# Patient Record
Sex: MALE | Race: WHITE | NOT HISPANIC OR LATINO | Employment: FULL TIME | URBAN - METROPOLITAN AREA
[De-identification: names, ages, dates, MRNs, and addresses within clinical notes are randomized per-mention and may not be internally consistent; named-entity substitution may affect disease eponyms.]

---

## 2023-09-25 DIAGNOSIS — R06.2 WHEEZING: Primary | ICD-10-CM

## 2023-09-26 RX ORDER — ALBUTEROL SULFATE 90 UG/1
AEROSOL, METERED RESPIRATORY (INHALATION)
Qty: 8 G | Refills: 3 | Status: SHIPPED | OUTPATIENT
Start: 2023-09-26

## 2023-12-18 ENCOUNTER — OFFICE VISIT (OUTPATIENT)
Dept: PRIMARY CARE | Facility: CLINIC | Age: 38
End: 2023-12-18
Payer: COMMERCIAL

## 2023-12-18 VITALS
SYSTOLIC BLOOD PRESSURE: 134 MMHG | OXYGEN SATURATION: 97 % | HEIGHT: 70 IN | WEIGHT: 209 LBS | BODY MASS INDEX: 29.92 KG/M2 | HEART RATE: 63 BPM | DIASTOLIC BLOOD PRESSURE: 72 MMHG | TEMPERATURE: 97.1 F

## 2023-12-18 DIAGNOSIS — Z00.00 HEALTH MAINTENANCE EXAMINATION: ICD-10-CM

## 2023-12-18 DIAGNOSIS — B00.1 HERPES LABIALIS: ICD-10-CM

## 2023-12-18 DIAGNOSIS — R06.2 WHEEZING: ICD-10-CM

## 2023-12-18 DIAGNOSIS — R09.82 POST-NASAL DRIP: Primary | ICD-10-CM

## 2023-12-18 DIAGNOSIS — K20.0 EOSINOPHILIC ESOPHAGITIS: ICD-10-CM

## 2023-12-18 LAB
ALBUMIN SERPL BCP-MCNC: 4.8 G/DL (ref 3.4–5)
ALP SERPL-CCNC: 54 U/L (ref 33–120)
ALT SERPL W P-5'-P-CCNC: 23 U/L (ref 10–52)
ANION GAP SERPL CALC-SCNC: 11 MMOL/L (ref 10–20)
AST SERPL W P-5'-P-CCNC: 21 U/L (ref 9–39)
BASOPHILS # BLD AUTO: 0.05 X10*3/UL (ref 0–0.1)
BASOPHILS NFR BLD AUTO: 0.8 %
BILIRUB SERPL-MCNC: 0.8 MG/DL (ref 0–1.2)
BUN SERPL-MCNC: 12 MG/DL (ref 6–23)
CALCIUM SERPL-MCNC: 9.9 MG/DL (ref 8.6–10.6)
CHLORIDE SERPL-SCNC: 103 MMOL/L (ref 98–107)
CHOLEST SERPL-MCNC: 239 MG/DL (ref 0–199)
CHOLESTEROL/HDL RATIO: 4.3
CO2 SERPL-SCNC: 27 MMOL/L (ref 21–32)
CREAT SERPL-MCNC: 1.09 MG/DL (ref 0.5–1.3)
EOSINOPHIL # BLD AUTO: 0.27 X10*3/UL (ref 0–0.7)
EOSINOPHIL NFR BLD AUTO: 4.1 %
ERYTHROCYTE [DISTWIDTH] IN BLOOD BY AUTOMATED COUNT: 11.4 % (ref 11.5–14.5)
GFR SERPL CREATININE-BSD FRML MDRD: 90 ML/MIN/1.73M*2
GLUCOSE SERPL-MCNC: 85 MG/DL (ref 74–99)
HCT VFR BLD AUTO: 40.9 % (ref 41–52)
HDLC SERPL-MCNC: 55.9 MG/DL
HGB BLD-MCNC: 14.2 G/DL (ref 13.5–17.5)
IMM GRANULOCYTES # BLD AUTO: 0.02 X10*3/UL (ref 0–0.7)
IMM GRANULOCYTES NFR BLD AUTO: 0.3 % (ref 0–0.9)
LDLC SERPL CALC-MCNC: 163 MG/DL
LYMPHOCYTES # BLD AUTO: 2.12 X10*3/UL (ref 1.2–4.8)
LYMPHOCYTES NFR BLD AUTO: 32.3 %
MCH RBC QN AUTO: 31.6 PG (ref 26–34)
MCHC RBC AUTO-ENTMCNC: 34.7 G/DL (ref 32–36)
MCV RBC AUTO: 91 FL (ref 80–100)
MONOCYTES # BLD AUTO: 0.28 X10*3/UL (ref 0.1–1)
MONOCYTES NFR BLD AUTO: 4.3 %
NEUTROPHILS # BLD AUTO: 3.82 X10*3/UL (ref 1.2–7.7)
NEUTROPHILS NFR BLD AUTO: 58.2 %
NON HDL CHOLESTEROL: 183 MG/DL (ref 0–149)
NRBC BLD-RTO: 0 /100 WBCS (ref 0–0)
PLATELET # BLD AUTO: 209 X10*3/UL (ref 150–450)
POTASSIUM SERPL-SCNC: 4 MMOL/L (ref 3.5–5.3)
PROT SERPL-MCNC: 7 G/DL (ref 6.4–8.2)
RBC # BLD AUTO: 4.5 X10*6/UL (ref 4.5–5.9)
SODIUM SERPL-SCNC: 137 MMOL/L (ref 136–145)
TRIGL SERPL-MCNC: 101 MG/DL (ref 0–149)
VLDL: 20 MG/DL (ref 0–40)
WBC # BLD AUTO: 6.6 X10*3/UL (ref 4.4–11.3)

## 2023-12-18 PROCEDURE — 90471 IMMUNIZATION ADMIN: CPT | Performed by: INTERNAL MEDICINE

## 2023-12-18 PROCEDURE — 99213 OFFICE O/P EST LOW 20 MIN: CPT | Performed by: INTERNAL MEDICINE

## 2023-12-18 PROCEDURE — 85025 COMPLETE CBC W/AUTO DIFF WBC: CPT

## 2023-12-18 PROCEDURE — 36415 COLL VENOUS BLD VENIPUNCTURE: CPT

## 2023-12-18 PROCEDURE — 90715 TDAP VACCINE 7 YRS/> IM: CPT | Performed by: INTERNAL MEDICINE

## 2023-12-18 PROCEDURE — 80061 LIPID PANEL: CPT

## 2023-12-18 PROCEDURE — 99395 PREV VISIT EST AGE 18-39: CPT | Performed by: INTERNAL MEDICINE

## 2023-12-18 PROCEDURE — 80053 COMPREHEN METABOLIC PANEL: CPT

## 2023-12-18 RX ORDER — VALACYCLOVIR HYDROCHLORIDE 500 MG/1
TABLET, FILM COATED ORAL
Qty: 6 TABLET | Refills: 2 | Status: SHIPPED | OUTPATIENT
Start: 2023-12-18 | End: 2023-12-18 | Stop reason: ENTERED-IN-ERROR

## 2023-12-18 RX ORDER — ALBUTEROL SULFATE 90 UG/1
2 AEROSOL, METERED RESPIRATORY (INHALATION) EVERY 4 HOURS PRN
Qty: 8.5 G | Refills: 2 | Status: SHIPPED | OUTPATIENT
Start: 2023-12-18 | End: 2024-12-17

## 2023-12-18 RX ORDER — FLUTICASONE PROPIONATE 50 MCG
2 SPRAY, SUSPENSION (ML) NASAL DAILY
Qty: 16 G | Refills: 11 | Status: SHIPPED | OUTPATIENT
Start: 2023-12-18 | End: 2023-12-18 | Stop reason: SDUPTHER

## 2023-12-18 RX ORDER — OMEPRAZOLE 20 MG/1
20 CAPSULE, DELAYED RELEASE ORAL DAILY
Qty: 90 CAPSULE | Refills: 3 | Status: SHIPPED | OUTPATIENT
Start: 2023-12-18 | End: 2024-12-17

## 2023-12-18 RX ORDER — VALACYCLOVIR HYDROCHLORIDE 1 G/1
TABLET, FILM COATED ORAL
Qty: 4 TABLET | Refills: 5 | Status: SHIPPED | OUTPATIENT
Start: 2023-12-18 | End: 2023-12-25

## 2023-12-18 RX ORDER — FLUTICASONE PROPIONATE 50 MCG
2 SPRAY, SUSPENSION (ML) NASAL DAILY
Qty: 16 G | Refills: 2 | Status: SHIPPED | OUTPATIENT
Start: 2023-12-18 | End: 2024-12-17

## 2023-12-18 ASSESSMENT — ENCOUNTER SYMPTOMS
DIZZINESS: 0
FEVER: 0
ACTIVITY CHANGE: 0
SORE THROAT: 0
DIFFICULTY URINATING: 0
JOINT SWELLING: 0
APPETITE CHANGE: 0
DYSURIA: 0
COUGH: 0
FATIGUE: 0
ABDOMINAL PAIN: 0
WHEEZING: 1
SEIZURES: 0
PALPITATIONS: 1
RHINORRHEA: 0

## 2023-12-18 NOTE — PATIENT INSTRUCTIONS
- flonase daily   - albuterol prior to exercise  - if no improvement please come back to us and we can talk about pulmonary function tests

## 2023-12-18 NOTE — PROGRESS NOTES
Subjective   Patient ID: Eugene West is a 38 y.o. male who presents for Annual Exam.    37 yo M hx allergic rhinitis vs postnasal drip vs exercise induced asthma, GERD and EoE (resolved), cold sores presenting for annual physical.    Since about a year ago Feels he is wheezing with exercise, coughing  On and off most of the year  Has been using the inhaler more the past 3 weeks in the morning and before he goes to bed and feels that it helps with the wheezing  Never had asthma as a child  It has limited his ability to exercise the way that he would like - tried some more aerobic exercise this summer and started to wheeze, inhaler helped immediately  Not coughing at night, feels like he has some flegm  Feels tightness in his throat  Has had some congestion in the last few weeks but not sure if its a cold  No itchy eyes, no sneezing    Having some palpitations intermittently, less than weekly once or twice a month. No dizziness. Lasting seconds. Not associated with exercise.     Not having worsening stress or anxiety symptoms aside from the usual with 4 kids.    Has GERD symptoms intermittently and takes omeprazole when he knows the reflux will flare but feels it is fairly well controlled.   Sep 2020 and dec 2020 scopes doesn't know if he is supposed to have followup    Etoh: at home 5-6 drinks per week but when traveling he drinks about 15 drinks per week, he says he knows he needs to cut down on that  Nonsmoker, occasional marijuana  Works as a  and travels sometimes twice a month  Has 4 boys, youngest is 7 months    Exercise: 5 times a week mostly weight training  Diet: has been fairly unhealthy lately with the travel      ----------------Copied forward for reference:   PMH:  -GERD with esophagitis  -Recurrent herpes labialis  - PND/allergies, ? reactive airway disease/exercise-induced asthma    PSH:  -EGD x2  -[place] teeth removal  -Deviated septum repair     Moved with his family from [place]  "in January 2021.  Lives with his wife and 3 sons (ages 5, almost 4 and 2) in [place]. Expecting #4 in May 2023.  He works for  for [employer].  Has decreased his EtOH intake, can have 2-4 drinks a few times a week. No tobacco but occasional marijuana use.  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  Continues to workout regularly, combination of cardio and weights. Diet is on and off, moderation has been a challenge. Over the past 2-3 years he has fluctuated between around 180 and 200 pounds.            Review of Systems   Constitutional:  Negative for activity change, appetite change, fatigue and fever.   HENT:  Positive for congestion. Negative for postnasal drip, rhinorrhea and sore throat.    Eyes:  Negative for visual disturbance.   Respiratory:  Positive for wheezing. Negative for cough.    Cardiovascular:  Positive for palpitations. Negative for chest pain and leg swelling.   Gastrointestinal:  Negative for abdominal pain.   Genitourinary:  Negative for difficulty urinating and dysuria.   Musculoskeletal:  Negative for joint swelling.   Skin:  Negative for rash.   Neurological:  Negative for dizziness and seizures.       Objective   /72   Pulse 63   Temp 36.2 °C (97.1 °F)   Ht 1.778 m (5' 10\")   Wt 94.8 kg (209 lb)   SpO2 97%   BMI 29.99 kg/m²     Physical Exam  Constitutional:       General: He is not in acute distress.  HENT:      Right Ear: Tympanic membrane normal.      Left Ear: Tympanic membrane normal.      Mouth/Throat:      Mouth: Mucous membranes are moist.      Comments: Cobblestoning in the pharynx  Eyes:      Extraocular Movements: Extraocular movements intact.   Cardiovascular:      Rate and Rhythm: Normal rate and regular rhythm.      Pulses: Normal pulses.   Pulmonary:      Effort: Pulmonary effort is normal. No respiratory distress.      Breath sounds: No wheezing.   Abdominal:      General: There is no distension. "   Musculoskeletal:         General: No swelling.   Lymphadenopathy:      Cervical: No cervical adenopathy.   Skin:     Findings: No bruising or rash.   Neurological:      General: No focal deficit present.      Mental Status: Mental status is at baseline.         Assessment/Plan     37 yo M hx allergic rhinitis vs postnasal drip vs exercise induced asthma, GERD and EoE (resolved), cold sores presenting for annual physical. He describes some symptoms consistent with postnasal drip including feeling that he is wheezing and has phlegm in his throat in the morning and so will trial flonase nasal spray but that he has some exercise induced symptoms and improvement with albuterol use make a reactive airway component also likely. Will trial flonase and albuterol inhaler prior to exercise and assess for improvement - can do PFTs if not improving after flonase.    Plan:   #wheeze - postnasal drip, possible exercise induced asthma  - continue albuterol as needed: can do 15-30 minutes prior to exercise  - trial flonase nightly   - if no improvement or worsening can do PFTs, may need methacholine challenge if negative    #palpitations - intermittent, not exercise induced, no syncope  - advised to call or schedule if they become more frequent, more prolonged, and if associated with loss of consciousness or dizziness and would do some event monitoring but at this time will hold    #EoE and GERD  - continue omeprazole daily as needed   - can consider GI referral if GERD symptoms, or symptoms of food getting stuck return    #cold sores  - refilled valtrex 2g BID for 1 day at first sign of outbreak    #health maintenance:   - immunizations: thinks maybe tdap was in 2011 but might have been [id number] he is ok with repeating that today, advise flu vaccine and covid   - lifestyle: excellent exercise, continue to work on healthy diet and alcohol intake (too much when travelling for work), nonsmoker  - declines STI screening  - lipids  CBC and CMP today   Problem List Items Addressed This Visit             ICD-10-CM    Eosinophilic esophagitis K20.0    Relevant Medications    omeprazole (PriLOSEC) 20 mg DR capsule    Post-nasal drip - Primary R09.82    Relevant Medications    fluticasone (Flonase) 50 mcg/actuation nasal spray    Herpes labialis B00.1    Relevant Medications    valACYclovir (Valtrex) 1 gram tablet     Other Visit Diagnoses         Codes    Wheezing     R06.2    Relevant Medications    albuterol (ProAir HFA) 90 mcg/actuation inhaler    Health maintenance examination     Z00.00    Relevant Orders    CBC and Auto Differential    Comprehensive metabolic panel    Lipid panel

## 2023-12-19 NOTE — RESULT ENCOUNTER NOTE
Kidney function, liver function, blood counts all within normal limits; LDL is 163 up from 136 last year aligns with some weight gain and less healthy diet as discussed during his visit yesterday - called Mr. West to let him know about this result and to focus on lifestyle changes to help improve the cholesterol.

## 2023-12-19 NOTE — PROGRESS NOTES
I saw and evaluated the patient. I personally obtained the key and critical portions of the history and physical exam or was physically present for key and critical portions performed by the resident/fellow. I reviewed the resident/fellow's documentation and discussed the patient with the resident/fellow. I agree with the resident/fellow's medical decision making as documented in the note.    Tang Foote MD

## 2024-12-16 ENCOUNTER — EVALUATION (OUTPATIENT)
Dept: PHYSICAL THERAPY | Facility: HOSPITAL | Age: 39
End: 2024-12-16
Payer: COMMERCIAL

## 2024-12-16 DIAGNOSIS — G89.29 CHRONIC RIGHT SHOULDER PAIN: Primary | ICD-10-CM

## 2024-12-16 DIAGNOSIS — M54.9 UPPER BACK PAIN: ICD-10-CM

## 2024-12-16 DIAGNOSIS — M25.511 CHRONIC RIGHT SHOULDER PAIN: Primary | ICD-10-CM

## 2024-12-16 PROCEDURE — 97110 THERAPEUTIC EXERCISES: CPT | Mod: GP | Performed by: PHYSICAL THERAPIST

## 2024-12-16 PROCEDURE — 97161 PT EVAL LOW COMPLEX 20 MIN: CPT | Mod: GP | Performed by: PHYSICAL THERAPIST

## 2024-12-16 PROCEDURE — 97140 MANUAL THERAPY 1/> REGIONS: CPT | Mod: GP | Performed by: PHYSICAL THERAPIST

## 2024-12-16 NOTE — PROGRESS NOTES
Physical Therapy  Physical Therapy Orthopedic Evaluation    Patient Name: Eugene West  MRN: [id number]  Today's Date: 12/16/2024  Time Calculation  Start Time: 1415  Stop Time: 1505  Time Calculation (min): 50 min    Insurance:  Visit number: 1 of 20  Authorization info: No Auth  Insurance Type: MMO    General:  Reason for visit: (R) Shoulder and Cervicothoracic Pain  Referred by: Direct Access    Current Problem  1. Chronic right shoulder pain        2. Upper back pain            Precautions: None  Precautions  STEADI Fall Risk Score (The score of 4 or more indicates an increased risk of falling): 0    Medical History Form: Reviewed (scanned into chart)    Subjective:     Chief Complaint: Patient presents to clinic with complaints of (R) shoulder and cervicothoracic pain. Patient reports (R) shoulder pain began in 2008/2009 while playing baseball. Patient states he was diagnosed with a labral tear and elected for conservative management; performed rehab and was able to continue active lifestyle. Patient notes a few year ago he began experiencing occasional shoulder discomfort; symptoms most present after heavy activity or weight lifting. Patient notes that symptoms have continued to worsen and become more consistent over the past year. Pain is located to anterior shoulder and is greatest during and following overhead activities.    Patient also notes recent cervicothoracic discomfort which started ~2 months ago. Patient notes pain began after performing heavy shrugs (denies discomfort or issue during exercise). Patient states symptoms have slightly improved but are still present when bending forward or reaching across body.    Roberta experiencing neck pain about 2 months ago after performing shrugs in the gym. Pain is slightly improved but has remained fairly constant. Sharp 8/10. Left rotation tight and pain side bend left  Onset Date: 10/1/2024  MATHIEU: Chronic and Overuse    Current Condition:    "Same    Pain:  Pain Assessment: 0-10  0-10 (Numeric) Pain Score: 8  Location: (R) Shoulder 7/10  Description: Sharp, tight, aching  Aggravating Factors: Reaching overhead, Reaching behind back, and Dressing/Grooming  Relieving Factors:  Rest and Ice    Location: Cervicothoracic 8/10  Description: Sharp  Aggravating Factors: Dressing/Grooming, bending forward, reaching across body  Relieving Factors:  Rest    Relevant Information (PMH & Previous Tests/Imaging): X Ray, MRI of shoulder  Previous Interventions/Treatments: Physical Therapy    Prior Level of Function (PLOF)  Patient previously independent with all ADLs  Exercise/Physical Activity: Exercises regularly; cross training  Work/School: Full time; does not interfere with work    Patients Living Environment: Reviewed and no concern    Primary Language: English    There are no spiritual/cultural practices/values/needs that are important to know    Patient's Goal(s) for Therapy: \"I want to regain shoulder ROM and decrease my shoulder and upper back pain so I can remain active.\"    Red Flags: Do you have any of the following? No  Fever/chills, unexplained weight changes, dizziness/fainting, unexplained change in bowel or bladder functions, unexplained malaise or muscle weakness, night pain/sweats, numbness or tingling    Objective:  Objective       ROM    Cervical AROM (Degrees)    Flexion: WNL  Extension: WNL  (L) Side Bend: 75%  (R) Side Bend: WNL  (L) Rotation: 75%  (R) Rotation: WNL      Shoulder AROM (Degrees)      (R)  (L)  Flexion: 175  175   Abduction: 170  170     Functional ER: T2  T3     Functional IR: L4  T12         Shoulder PROM (Degrees)      (R)  (L)  Flexion: 175  175      Abduction: 170  170     ER at 0:  85  85     IR at 0:  Belly  Belly     ER at 45: 85  85      IR at 45: 45  75      ER at 90: 80  80      IR at 90: 40  75               Strength " Testing    Shoulder    (R)  (L)  Flexion: 5/5  5/5      Abduction: 5/5  5/5     ER:  4+/5  5/5     IR:  4+/5  5/5         Elbow    (R)  (L)  Flexion: 5/5  5/5       Extension: 5/5  5/5         Periscapular Musculature    (R)  (L)  Upper Traps: 5/5  5/5     Middle Traps: 4+/5  4+/5     Lower Traps: 4/5  4/5     Rhomboids: 4+/5  4+/5           Posture: Slightly rounded/anterior shoulders      Palpation: (+) TTP and increased tension noted throughout (R) RTC and deltoid musculature. (+) TTP and increased tension noted throughout cervicothoracic paraspinals.      Joint Mobility: Decreased PA glide noted to C7-T4. Tight posterior capsule (R) shoulder          Special Tests      Neer Impingement: (+) Pain  Joe Art: (+) Pain  Empty Can: (-)  Speeds Test: (-)  O'Briens Test: (+) R       Radicular Symptoms: Patient denies radicular symptoms        Outcome Measures:  Other Measures  Disability of Arm Shoulder Hand (DASH): 9.09     EDUCATION: home exercise program, plan of care, activity modifications, pain management, and injury pathology       Goals: Set and discussed today  Active       PT Problem       Short Term       Start:  12/18/24    Expected End:  01/13/25       Decrease patients complaints of (R) shoulder pain to 3/10 on average with ADLs by week 4  Decrease patient complaints of cervicothoracic pain to 3/10 on avaerage with ADLs by week 4  Improved patients IR ROM to 60 degrees by 4 weeks to allow patient to reach behind back for dressing  Improve patients RTC and periscapular strength to 4/5 to decrease compensations with ADLs  Patient will be independent with HEP by week 1         Long Term       Start:  12/18/24    Expected End:  02/10/25       Decrease patients complaints of (R) shoulder and cervicothoracic pain to 0/10 with all ADLs and therapeutic exercises by week 8  Improve patients IR ROM to 80+ degrees by 8 weeks to allow patient to perform dressing, grooming, and throw a ball without  compensation  Improve patients RTC and periscapular strength to 5/5 to decrease compensations with ADLs               Plan of care was developed with input and agreement by the patient      Treatment Performed:  Therapeutic Exercise  Therapeutic Exercise Performed: Yes  Therapeutic Exercise Activity 1: Cross body streth 2 x 30 sec  Therapeutic Exercise Activity 2: (B) ER w/ scap retraction 2 x 10 reps  Therapeutic Exercise Activity 3: ER 2 x 10 reps  Therapeutic Exercise Activity 4: IR 2 x 10 reps  Therapeutic Exercise Activity 5: Self mobilization with lacrosse ball    Manual Therapy  Manual Therapy Performed: Yes (STM to (R) deltoid, RTC, UT and thoracic paraspinals. Joint mobilizations to (R) shoulder and T spine. PROM/Manaul stretching to (R) shoulder)    \    PT Evaluation Time Entry  PT Evaluation (Low) Time Entry: 25  PT Therapeutic Procedures Time Entry  Manual Therapy Time Entry: 15  Therapeutic Exercise Time Entry: 10                      Assessment: Patient presents with signs and symptoms consistent with (R) shoulder pain secondary to previous labral tear and GIRD and cervicothoracic discomfort. These impairments are resulting in limited participation in pain-free ADLs and inability to perform at their prior level of function. Pt would benefit from physical therapy to address the impairments found & listed previously in the objective section in order to return to safe and pain-free ADLs and prior level of function.    *Advised patient to follow up with Dr. Valadez or Dr. Mays regarding shoulder pathology.       Clinical Presentation: Stable and/or uncomplicated characteristics    Plan:     Planned Interventions include: therapeutic exercise, self-care home management, manual therapy, therapeutic activities, gait training, neuromuscular coordination, vasopneumatic, dry needling, aquatic therapy  Frequency: 1-2 x Week  Duration: 8 Weeks  Rehab Potential/Prognosis: Giovany Cardenas, PT

## 2024-12-18 PROBLEM — G89.29 CHRONIC RIGHT SHOULDER PAIN: Status: ACTIVE | Noted: 2024-12-18

## 2024-12-18 PROBLEM — M25.511 CHRONIC RIGHT SHOULDER PAIN: Status: ACTIVE | Noted: 2024-12-18

## 2024-12-18 PROBLEM — M54.9 UPPER BACK PAIN: Status: ACTIVE | Noted: 2024-12-18

## 2024-12-18 ASSESSMENT — ENCOUNTER SYMPTOMS
OCCASIONAL FEELINGS OF UNSTEADINESS: 0
DEPRESSION: 0
LOSS OF SENSATION IN FEET: 0

## 2024-12-19 ASSESSMENT — PAIN - FUNCTIONAL ASSESSMENT: PAIN_FUNCTIONAL_ASSESSMENT: 0-10

## 2024-12-19 ASSESSMENT — PAIN SCALES - GENERAL: PAINLEVEL_OUTOF10: 8

## 2024-12-20 ENCOUNTER — APPOINTMENT (OUTPATIENT)
Dept: PRIMARY CARE | Facility: CLINIC | Age: 39
End: 2024-12-20
Payer: COMMERCIAL

## 2024-12-23 ENCOUNTER — APPOINTMENT (OUTPATIENT)
Dept: PHYSICAL THERAPY | Facility: HOSPITAL | Age: 39
End: 2024-12-23
Payer: COMMERCIAL

## 2024-12-23 DIAGNOSIS — M54.9 UPPER BACK PAIN: ICD-10-CM

## 2024-12-23 DIAGNOSIS — G89.29 CHRONIC RIGHT SHOULDER PAIN: Primary | ICD-10-CM

## 2024-12-23 DIAGNOSIS — M25.511 CHRONIC RIGHT SHOULDER PAIN: Primary | ICD-10-CM

## 2024-12-23 PROCEDURE — 97110 THERAPEUTIC EXERCISES: CPT | Mod: GP | Performed by: PHYSICAL THERAPIST

## 2024-12-23 PROCEDURE — 97140 MANUAL THERAPY 1/> REGIONS: CPT | Mod: GP | Performed by: PHYSICAL THERAPIST

## 2024-12-31 ASSESSMENT — PAIN - FUNCTIONAL ASSESSMENT: PAIN_FUNCTIONAL_ASSESSMENT: 0-10

## 2024-12-31 ASSESSMENT — PAIN SCALES - GENERAL: PAINLEVEL_OUTOF10: 6

## 2024-12-31 NOTE — PROGRESS NOTES
Physical Therapy  Physical Therapy Treatment Note    Patient Name: Eugene West  MRN: [id number]  Today's Date: 12/23/2024  Time Calculation  Start Time: 1330  Stop Time: 1430  Time Calculation (min): 60 min    Insurance:  Visit number: 2 of of 20  Authorization info: No Auth  Insurance Type: MMO     General:  Reason for visit: (R) Shoulder and Cervicothoracic Pain  Referred by: Direct Access    Current Problem  1. Chronic right shoulder pain        2. Upper back pain            Precautions: None      Subjective:     Patient reports general improvement in upper back and right shoulder pain. States he has been perfomring HEP    Pain  Pain Assessment: 0-10  0-10 (Numeric) Pain Score: 6    Performing HEP?: Yes      Objective:   (R) Shoulder ROM:  ER: 85  IR: 50 following manual      Treatment Performed:  Therapeutic Exercise  Therapeutic Exercise Performed: Yes  Therapeutic Exercise Activity 1: Cross body streth 2 x 30 sec  Therapeutic Exercise Activity 2: (B) ER w/ scap retraction 2 x 10 reps  Therapeutic Exercise Activity 3: ER 2 x 10 reps  Therapeutic Exercise Activity 4: IR 2 x 10 reps  Therapeutic Exercise Activity 5: D2 Flexion 2 x 10 reps  Therapeutic Exercise Activity 6: D2 extension 2 x 10 reps  Therapeutic Exercise Activity 7: Prone T and Y 2 x 10 reps  Therapeutic Exercise Activity 8: Sidelying ER 2 x 10 reps w/ 3lbs  Therapeutic Exercise Activity 9: Rows 2 x 10 reps  Therapeutic Exercise Activity 10: Shoulder extensions 2 x 10 reps    Manual Therapy  Manual Therapy Performed: Yes  Manual Therapy Activity 1: STM to (R) deltoid, RTC and periscapular musculature  Manual Therapy Activity 2: STM to (B) cervical/lumbar paraspinals, traps, and levator scap  Manual Therapy Activity 3: GH Joint mobilizations  Manual Therapy Activity 4: P-A glides to cervical/thoracic spine         PT Therapeutic Procedures Time Entry  Manual Therapy Time Entry: 25  Therapeutic Exercise Time Entry: 35                    Assessment:    The focus of the session was Strengthening, ROM, Stretching, joint mobilizations, and soft tissue massage. The pt demonstrated Fair tolerance to the noted exercises today. The pt is demonstrated Fair progress in skilled rehab at this time. The pt is still limited in overall Strength, ROM, Flexibility, Motor control, and Pain at this time. The pt continues to be a good candidate for skilled PT, in order to further improve Strength, ROM, Flexibility, Motor control, and Pain.        Plan:  Patient to continue following post op protocol; Continue to progress Shoulder and Scapular strength; Manual therapy to restore pain free ROM; Patient education on HEP and activity modification.        Vamsi Cardenas, PT

## 2025-01-07 ENCOUNTER — TREATMENT (OUTPATIENT)
Dept: PHYSICAL THERAPY | Facility: HOSPITAL | Age: 40
End: 2025-01-07
Payer: COMMERCIAL

## 2025-01-07 DIAGNOSIS — G89.29 CHRONIC RIGHT SHOULDER PAIN: ICD-10-CM

## 2025-01-07 DIAGNOSIS — M25.511 CHRONIC RIGHT SHOULDER PAIN: ICD-10-CM

## 2025-01-07 DIAGNOSIS — M54.9 UPPER BACK PAIN: ICD-10-CM

## 2025-01-07 PROCEDURE — 97140 MANUAL THERAPY 1/> REGIONS: CPT | Mod: GP | Performed by: PHYSICAL THERAPIST

## 2025-01-07 PROCEDURE — 97110 THERAPEUTIC EXERCISES: CPT | Mod: GP | Performed by: PHYSICAL THERAPIST

## 2025-01-07 ASSESSMENT — PAIN - FUNCTIONAL ASSESSMENT: PAIN_FUNCTIONAL_ASSESSMENT: 0-10

## 2025-01-07 ASSESSMENT — PAIN SCALES - GENERAL: PAINLEVEL_OUTOF10: 7

## 2025-01-07 NOTE — PROGRESS NOTES
Physical Therapy  Physical Therapy Treatment Note    Patient Name: Eugene West  MRN: [id number]  Today's Date: 1/7/2025  Time Calculation  Start Time: 1300  Stop Time: 1400  Time Calculation (min): 60 min    Insurance:  Visit number: 3 of of 20 (2 visits used in 2024)  Authorization info: No Auth  Insurance Type: MMO     General:  Reason for visit: (R) Shoulder and Cervicothoracic Pain  Referred by: Direct Access    Current Problem  1. Chronic right shoulder pain  Follow Up In Physical Therapy      2. Upper back pain  Follow Up In Physical Therapy          Precautions: None      Subjective:     Patient notes increased overall soreness the past week. Patient states he has been more active with family, but has not been performing weight training. Patient notes shoulder motion seems improved, but soreness is still present.    Pain  Pain Assessment: 0-10  0-10 (Numeric) Pain Score: 7    Performing HEP?: Yes      Objective:   (R) Shoulder ROM:  ER: 85  IR: 50 following manual      Treatment Performed:  Therapeutic Exercise  Therapeutic Exercise Performed: Yes  Therapeutic Exercise Activity 1: Cross body streth 2 x 30 sec  Therapeutic Exercise Activity 2: Sidelying ER 3 x 10 reps  Therapeutic Exercise Activity 5: D2 Flexion 2 x 10 reps  Therapeutic Exercise Activity 6: D2 extension 2 x 10 reps  Therapeutic Exercise Activity 7: Prone T and Y 2 x 10 reps  Therapeutic Exercise Activity 9: Rows from high pulley 3 x 10 reps  Therapeutic Exercise Activity 10: Row from low pulley 3 x 10 reps    Manual Therapy  Manual Therapy Performed: Yes  Manual Therapy Activity 1: STM to (R) deltoid, RTC and periscapular musculature  Manual Therapy Activity 2: STM to (B) cervical/lumbar paraspinals, traps, and levator scap  Manual Therapy Activity 3: GH Joint mobilizations  Manual Therapy Activity 4: P-A glides to cervical/thoracic spine         PT Therapeutic Procedures Time Entry  Manual Therapy Time Entry: 30  Therapeutic Exercise Time  Entry: 30                    Assessment:   The focus of the session was Strengthening, ROM, Stretching, joint mobilizations, and soft tissue massage. The pt demonstrated Fair tolerance to the noted exercises today. Patient continues to have pain and limited mobility. The pt is demonstrated Fair progress in skilled rehab at this time. The pt is still limited in overall Strength, ROM, Flexibility, Motor control, and Pain at this time. The pt continues to be a good candidate for skilled PT, in order to further improve Strength, ROM, Flexibility, Motor control, and Pain.        Plan:  Patient to continue following post op protocol; Continue to progress Shoulder and Scapular strength; Manual therapy to restore pain free ROM; Patient education on HEP and activity modification.  Patient will be following up with Dr. Valadez this week.      Vamsi Cardenas, PT

## 2025-01-09 ENCOUNTER — HOSPITAL ENCOUNTER (OUTPATIENT)
Dept: RADIOLOGY | Facility: HOSPITAL | Age: 40
Discharge: HOME | End: 2025-01-09
Payer: COMMERCIAL

## 2025-01-09 ENCOUNTER — OFFICE VISIT (OUTPATIENT)
Dept: ORTHOPEDIC SURGERY | Facility: HOSPITAL | Age: 40
End: 2025-01-09
Payer: COMMERCIAL

## 2025-01-09 VITALS — HEIGHT: 71 IN | BODY MASS INDEX: 28 KG/M2 | WEIGHT: 200 LBS

## 2025-01-09 DIAGNOSIS — S43.431A SUPERIOR LABRUM ANTERIOR-TO-POSTERIOR (SLAP) TEAR OF RIGHT SHOULDER: ICD-10-CM

## 2025-01-09 DIAGNOSIS — M25.511 RIGHT SHOULDER PAIN, UNSPECIFIED CHRONICITY: ICD-10-CM

## 2025-01-09 PROCEDURE — 99214 OFFICE O/P EST MOD 30 MIN: CPT | Performed by: STUDENT IN AN ORGANIZED HEALTH CARE EDUCATION/TRAINING PROGRAM

## 2025-01-09 PROCEDURE — 1036F TOBACCO NON-USER: CPT | Performed by: STUDENT IN AN ORGANIZED HEALTH CARE EDUCATION/TRAINING PROGRAM

## 2025-01-09 PROCEDURE — 73030 X-RAY EXAM OF SHOULDER: CPT | Mod: RIGHT SIDE | Performed by: RADIOLOGY

## 2025-01-09 PROCEDURE — 73030 X-RAY EXAM OF SHOULDER: CPT | Mod: RT

## 2025-01-09 PROCEDURE — 99204 OFFICE O/P NEW MOD 45 MIN: CPT | Performed by: STUDENT IN AN ORGANIZED HEALTH CARE EDUCATION/TRAINING PROGRAM

## 2025-01-09 PROCEDURE — 3008F BODY MASS INDEX DOCD: CPT | Performed by: STUDENT IN AN ORGANIZED HEALTH CARE EDUCATION/TRAINING PROGRAM

## 2025-01-09 ASSESSMENT — PAIN - FUNCTIONAL ASSESSMENT: PAIN_FUNCTIONAL_ASSESSMENT: 0-10

## 2025-01-09 ASSESSMENT — PAIN SCALES - GENERAL: PAINLEVEL_OUTOF10: 4

## 2025-01-09 NOTE — PROGRESS NOTES
PRIMARY CARE PHYSICIAN: Tang Foote MD  REFERRING PROVIDER: No referring provider defined for this encounter.     CONSULT ORTHOPAEDIC: Shoulder Evaluation    ASSESSMENT & PLAN    Impression: 39 y.o. male with right shoulder pain and dysfunction in the setting of a long pitching career concerning for a displaced SLAP tear and biceps tenosynovitis with underlying glenohumeral degenerative changes    Plan:   I explained to the patient the nature of their diagnosis.  I reviewed their imaging studies with them.    Based on the history, physical exam and imaging studies above, the patient's presentation is consistent with the above diagnosis.  I had a long discussion with the patient regarding their presentation and the treatment options.  We discussed initial nonoperative versus operative management options as well as potential further diagnostic imaging.  I reviewed the patient's x-ray findings with him.  He does have a moderate amount of degenerative change in the right shoulder.  However, his symptoms appear to be related to his superior labrum and biceps anchor.  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  This affects all of his activities and he has developed fairly significant stiffness and limitations in his range of motion.  He has been working diligently with physical therapy but has been unable to progress past his current point.  I believe that he may benefit from an arthroscopic procedure to address this, however I recommend proceeding with an MRI of the right shoulder first to help evaluate his chondral surfaces, rotator cuff and biceps/biceps anchor and SLAP region for preoperative planning.    The MRI is medically necessary and indicated given her subjective complaints and physical exam findings as described below . The MRI will be used for potential presurgical planning purposes. The MRI should be performed in a hospital setting so the surgeon  "has immediate access to the images.    Follow-Up: Patient will follow-up after the MRI is completed to review the imaging studies and discuss a treatment plan going forward    At the end of the visit, all questions were answered in full. The patient is in agreement with the plan and recommendations. They will call the office with any questions/concerns.    Note dictated with [employer] software. Completed without full typed error editing and sent to avoid delay.       SUBJECTIVE  CHIEF COMPLAINT:   Chief Complaint   Patient presents with    Right Shoulder - Pain        HPI: Eugene West is a 39 y.o. male patient who presents today with right shoulder pain.  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  He has been working diligently with physical therapy here but has had a bit of a plateau in terms of his recovery and progress.  X-rays done today. He has been dealing with right shoulder pain intermittently for the past several years, increasing in severity over the past few months. Eugene is currently seeing Vamsi at [facility] for physical therapy. He is struggling to sleep due to shoulder pain. Aleve PRN for pain. He is struggling with ADLs and playing with kids due to shoulder pain. He is unable to perform [place] workouts. He does report an occasional \"catching\" sensation in the joint, associated with a sharp pain. 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     They deny any associated neck pain.  No numbness, tingling, or paresthesias.    REVIEW OF SYSTEMS  Constitutional: See HPI for pain assessment, No significant weight loss, recent trauma  Cardiovascular: No chest pain, shortness of breath  Respiratory: No difficulty breathing, cough  Gastrointestinal: No nausea, vomiting, diarrhea, constipation  Musculoskeletal: Noted in HPI, positive for pain, restricted motion, " stiffness  Integumentary: No rashes, easy bruising, redness   Neurological: no numbness or tingling in extremities, no gait disturbances   Psychiatric: No mood changes, memory changes, social issues  Heme/Lymph: no excessive swelling, easy bruising, excessive bleeding  ENT: No hearing changes  Eyes: No vision changes    Past Medical History:   Diagnosis Date    Eosinophilic esophagitis     Eosinophilic esophagitis        No Known Allergies     Past Surgical History:   Procedure Laterality Date    OTHER SURGICAL HISTORY  12/07/2021    [place] tooth extraction    OTHER SURGICAL HISTORY  12/07/2021    Esophagoscopy        No family history on file.     Social History     Socioeconomic History    Marital status:      Spouse name: Not on file    Number of children: Not on file    Years of education: Not on file    Highest education level: Not on file   Occupational History    Not on file   Tobacco Use    Smoking status: Never    Smokeless tobacco: Never   Substance and Sexual Activity    Alcohol use: Not on file    Drug use: Not on file    Sexual activity: Not on file   Other Topics Concern    Not on file   Social History Narrative    Not on file     Social Drivers of Health     Financial Resource Strain: Not on file   Food Insecurity: Not on file   Transportation Needs: Not on file   Physical Activity: Not on file   Stress: Not on file   Social Connections: Not on file   Intimate Partner Violence: Not on file   Housing Stability: Not on file        CURRENT MEDICATIONS:   Current Outpatient Medications   Medication Sig Dispense Refill    albuterol (ProAir HFA) 90 mcg/actuation inhaler Inhale 2 puffs every 4 hours if needed for wheezing, shortness of breath or other (take prior to exercise). 8.5 g 2    albuterol 90 mcg/actuation inhaler INHALE 2 PUFFS EVERY 4 HOURS AS NEEDED FOR COUGH OR SHORTNESS OF BREATH. 8 g 3    fluticasone (Flonase) 50 mcg/actuation nasal spray Administer 2 sprays into each nostril once daily.  "Shake gently. Before first use, prime pump. After use, clean tip and replace cap. 16 g 2    omeprazole (PriLOSEC) 20 mg DR capsule Take 1 capsule (20 mg) by mouth once daily. Do not crush or chew. 90 capsule 3     No current facility-administered medications for this visit.        OBJECTIVE    PHYSICAL EXAM      12/7/2021     2:26 PM 9/27/2022     5:47 PM 10/7/2022    12:45 PM 12/12/2022     2:08 PM 12/18/2023     2:13 PM   Vitals   Systolic 124 135 122 127 134   Diastolic 83 82 81 78 72   Heart Rate 57 67 68 62 63   Temp 36.1 °C (97 °F) 36.8 °C (98.3 °F) 36.2 °C (97.1 °F) 36.4 °C (97.5 °F) 36.2 °C (97.1 °F)   Resp  18      Height 1.803 m (5' 11\")  1.803 m (5' 11\") 1.791 m (5' 10.5\") 1.778 m (5' 10\")   Weight (lb) 200 200 198 201 209   BMI 27.89 kg/m2 27.89 kg/m2 27.62 kg/m2 28.43 kg/m2 29.99 kg/m2   BSA (m2) 2.13 m2 2.13 m2 2.12 m2 2.13 m2 2.16 m2   Visit Report     Report      There is no height or weight on file to calculate BMI.    GENERAL: A/Ox3, NAD. Appears healthy, well nourished  PSYCHIATRIC: Mood stable, appropriate memory recall  EYES: EOM intact, no scleral icterus  CARDIOVASCULAR: Palpable peripheral pulses  LUNGS: Breathing non-labored on room air  SKIN: no erythema, rashes, or ecchymosis     MUSCULOSKELETAL:  Laterality: right Shoulder Exam  - Negative Spurlings, full painless neck ROM  - Skin intact  - No erythema or warmth  - No ecchymosis or soft tissue swelling  - Shoulder ROM: Forward flexion 165, ER 55, IR upper lumbar, ER in abduction 95, IR in abduction 15  - Strength:      Jobes 5-/5     ER 5-/5     Belly press and bearhug 5-/5  - Palpation: Positive tenderness anterior and posterior joint lines  - Joe and Neers positive  - Speeds and O'Briens positive  - Stability: Anterior/Posterior load and shift stable  - Special Tests: Positive resisted throwers    NEUROVASCULAR:  - Neurovascular Status: sensation intact to light touch distally, upper extremity motor grossly intact  - Capillary " refill brisk at extremities, Bilateral peripheral pulses 2+    Imaging: Multiple views of the affected right shoulder(s) demonstrate: Moderate degenerative changes of the glenohumeral joint with a fairly large inferior humeral head osteophyte, ossification of the labrum, no other acute osseous abnormality, largely maintained joint space of the glenohumeral joint.   X-rays were personally reviewed and interpreted by me.  Radiology reports were reviewed by me as well, if readily available at the time.      Bob Valadez MD  Attending Surgeon    Sports Medicine Orthopaedic Surgery  [facility] [place]  [employer]

## 2025-01-13 ENCOUNTER — TREATMENT (OUTPATIENT)
Dept: PHYSICAL THERAPY | Facility: HOSPITAL | Age: 40
End: 2025-01-13
Payer: COMMERCIAL

## 2025-01-13 DIAGNOSIS — M25.511 CHRONIC RIGHT SHOULDER PAIN: Primary | ICD-10-CM

## 2025-01-13 DIAGNOSIS — M54.9 UPPER BACK PAIN: ICD-10-CM

## 2025-01-13 DIAGNOSIS — G89.29 CHRONIC RIGHT SHOULDER PAIN: Primary | ICD-10-CM

## 2025-01-13 PROCEDURE — 97110 THERAPEUTIC EXERCISES: CPT | Mod: GP | Performed by: PHYSICAL THERAPIST

## 2025-01-13 PROCEDURE — 97140 MANUAL THERAPY 1/> REGIONS: CPT | Mod: GP | Performed by: PHYSICAL THERAPIST

## 2025-01-16 ASSESSMENT — PAIN - FUNCTIONAL ASSESSMENT: PAIN_FUNCTIONAL_ASSESSMENT: 0-10

## 2025-01-16 ASSESSMENT — PAIN SCALES - GENERAL: PAINLEVEL_OUTOF10: 6

## 2025-01-16 NOTE — PROGRESS NOTES
Physical Therapy  Physical Therapy Treatment Note    Patient Name: Eugene West  MRN: [id number]  Today's Date: 1/13/2025  Time Calculation  Start Time: 1200  Stop Time: 1300  Time Calculation (min): 60 min    Insurance:  Visit number: 4 of of 20 (2 visits used in 2024)  Authorization info: No Auth  Insurance Type: MMO     General:  Reason for visit: (R) Shoulder and Cervicothoracic Pain  Referred by: Direct Access    Current Problem  1. Chronic right shoulder pain        2. Upper back pain            Precautions: None      Subjective:     Patient had evaluation by MD who noted some arthritic changes and bone spurs in shoulder. Patient will be undergoing MRI to evaluate soft tissue structures. There will be follow up discussion on surgical/conservative options. Patient feels he has made some progress with PT, but continues to be very limited in shoulder mobility.    Pain  Pain Assessment: 0-10  0-10 (Numeric) Pain Score: 6    Performing HEP?: Yes      Objective:   (R) Shoulder ROM:  ER: 85  IR: 50 following manual      Treatment Performed:  Therapeutic Exercise:    35 min  Cross body stretch 2 x 30 sec  (B) ER w/ scap retraction 2 x 10 reps  Pull down and apart 2 x 10 reps  Prone T, Y and W - 2 x 10 reps each  Rows at 3 different angles - 2 x 10 reps each  Serratus push up 2 x 10 reps    Manual Therapy:    25 min  STM to (R) deltoid, RTC, and periscapular musculature  GH joint mobilizations  Manual stretching/PROM               PT Therapeutic Procedures Time Entry  Manual Therapy Time Entry: 25  Therapeutic Exercise Time Entry: 35                    Assessment:   The focus of the session was Strengthening, ROM, Stretching, joint mobilizations, and soft tissue massage. The pt demonstrated Fair tolerance to the noted exercises today. Patient continues to have pain and limited mobility. The pt is demonstrated Fair progress in skilled rehab at this time. Patient with good form with exercises performed; minimal cueing  required. The pt is still limited in overall Strength, ROM, Flexibility, Motor control, and Pain at this time. The pt continues to be a good candidate for skilled PT, in order to further improve Strength, ROM, Flexibility, Motor control, and Pain.        Plan:  Patient to continue following post op protocol; Continue to progress Shoulder and Scapular strength; Manual therapy to restore pain free ROM; Patient education on HEP and activity modification.  Patient will be following up with Dr. Valadez this week.      Vamsi Cardenas, PT

## 2025-01-31 ENCOUNTER — HOSPITAL ENCOUNTER (OUTPATIENT)
Dept: RADIOLOGY | Facility: HOSPITAL | Age: 40
Discharge: HOME | End: 2025-01-31
Payer: COMMERCIAL

## 2025-01-31 DIAGNOSIS — S43.431A SUPERIOR LABRUM ANTERIOR-TO-POSTERIOR (SLAP) TEAR OF RIGHT SHOULDER: ICD-10-CM

## 2025-01-31 PROCEDURE — 73221 MRI JOINT UPR EXTREM W/O DYE: CPT | Mod: RT

## 2025-02-03 ENCOUNTER — TREATMENT (OUTPATIENT)
Dept: PHYSICAL THERAPY | Facility: HOSPITAL | Age: 40
End: 2025-02-03
Payer: COMMERCIAL

## 2025-02-03 DIAGNOSIS — M54.9 UPPER BACK PAIN: ICD-10-CM

## 2025-02-03 DIAGNOSIS — M25.511 CHRONIC RIGHT SHOULDER PAIN: Primary | ICD-10-CM

## 2025-02-03 DIAGNOSIS — M25.511 PAIN IN RIGHT SHOULDER: ICD-10-CM

## 2025-02-03 DIAGNOSIS — G89.29 CHRONIC RIGHT SHOULDER PAIN: Primary | ICD-10-CM

## 2025-02-03 PROCEDURE — 97140 MANUAL THERAPY 1/> REGIONS: CPT | Mod: GP | Performed by: PHYSICAL THERAPIST

## 2025-02-03 PROCEDURE — 97110 THERAPEUTIC EXERCISES: CPT | Mod: GP | Performed by: PHYSICAL THERAPIST

## 2025-02-03 ASSESSMENT — PAIN SCALES - GENERAL: PAINLEVEL_OUTOF10: 7

## 2025-02-03 ASSESSMENT — PAIN - FUNCTIONAL ASSESSMENT: PAIN_FUNCTIONAL_ASSESSMENT: 0-10

## 2025-02-03 NOTE — PROGRESS NOTES
Physical Therapy  Physical Therapy Treatment Note    Patient Name: Eugene West  MRN: [id number]  Today's Date: 2/3/2025  Time Calculation  Start Time: 1000  Stop Time: 0145  Time Calculation (min): 945 min    Insurance:  Visit number: 5 of of 20 (2 visits used in 2024)  Authorization info: No Auth  Insurance Type: MMO     General:  Reason for visit: (R) Shoulder and Cervicothoracic Pain  Referred by: Direct Access    Current Problem  1. Chronic right shoulder pain        2. Pain in right shoulder  Follow Up In Physical Therapy      3. Upper back pain            Precautions: None      Subjective:     Patient reports no significant change in shoulder discomfort since last visit. Patient notes overall pain and ROM continues to limit ADLs. Patient did receive MRI and has follow up with MD to discuss results.    Pain  Pain Assessment: 0-10  0-10 (Numeric) Pain Score: 7    Performing HEP?: Yes      Objective:   (R) Shoulder ROM:  ER: 85  IR: 50 following manual      Treatment Performed:  Therapeutic Exercise:    25 min  Cross body stretch 2 x 30 sec  (B) ER w/ scap retraction 2 x 10 reps  Pull down and apart 2 x 10 reps  Prone T, Y and W - 2 x 10 reps each  Rows at 3 different angles - 2 x 10 reps each  Serratus push up 2 x 10 reps    Manual Therapy:    20 min  STM to (R) deltoid, RTC, and periscapular musculature  GH joint mobilizations  Manual stretching/PROM               PT Therapeutic Procedures Time Entry  Manual Therapy Time Entry: 20  Therapeutic Exercise Time Entry: 25                    Assessment:   The focus of the session was Strengthening, ROM, Stretching, joint mobilizations, and soft tissue massage. The pt demonstrated Fair tolerance to the noted exercises today. Patient with no significant improvement since last visit; waiting on discussion with MD for next steps in recovery process. The pt is demonstrated Poor progress in skilled rehab at this time. The pt is still limited in overall Strength, ROM,  Flexibility, Motor control, and Pain at this time. The pt continues to be a good candidate for skilled PT, in order to further improve Strength, ROM, Flexibility, Motor control, and Pain.        Plan:  Patient to continue following post op protocol; Continue to progress Shoulder and Scapular strength; Manual therapy to restore pain free ROM; Patient education on HEP and activity modification.  Patient will be following up with Dr. Valadez this week.      Vamsi Cardenas, PT

## 2025-02-05 ENCOUNTER — OFFICE VISIT (OUTPATIENT)
Dept: ORTHOPEDIC SURGERY | Age: 40
End: 2025-02-05
Payer: COMMERCIAL

## 2025-02-05 VITALS — BODY MASS INDEX: 28 KG/M2 | HEIGHT: 71 IN | WEIGHT: 200 LBS

## 2025-02-05 DIAGNOSIS — M25.511 RIGHT SHOULDER PAIN, UNSPECIFIED CHRONICITY: ICD-10-CM

## 2025-02-05 DIAGNOSIS — S43.431A SUPERIOR LABRUM ANTERIOR-TO-POSTERIOR (SLAP) TEAR OF RIGHT SHOULDER: Primary | ICD-10-CM

## 2025-02-05 PROCEDURE — 3008F BODY MASS INDEX DOCD: CPT | Performed by: STUDENT IN AN ORGANIZED HEALTH CARE EDUCATION/TRAINING PROGRAM

## 2025-02-05 PROCEDURE — 99214 OFFICE O/P EST MOD 30 MIN: CPT | Performed by: STUDENT IN AN ORGANIZED HEALTH CARE EDUCATION/TRAINING PROGRAM

## 2025-02-05 PROCEDURE — 1036F TOBACCO NON-USER: CPT | Performed by: STUDENT IN AN ORGANIZED HEALTH CARE EDUCATION/TRAINING PROGRAM

## 2025-02-05 NOTE — PROGRESS NOTES
PRIMARY CARE PHYSICIAN: Tang Foote MD  REFERRING PROVIDER: No referring provider defined for this encounter.     CONSULT ORTHOPAEDIC: Shoulder Evaluation    ASSESSMENT & PLAN    Impression: 40 y.o. 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    Plan:   I explained to the patient the nature of their diagnosis.  I reviewed their imaging studies with them.    Based on the history, physical exam and imaging studies above, the patient's presentation is consistent with the above diagnosis.  I had a long discussion with the patient regarding their presentation and the treatment options.  We discussed initial nonoperative versus operative management options as well as potential further diagnostic imaging.  I reviewed the patient's MRI findings with him.  He does have a moderate amount of degenerative change in the right shoulder.  Additionally, he has a nondisplaced SLAP tear with associated biceps tenosynovitis.  We discussed his treatment options going forward.  I do recommend continued nonoperative management at this time, however we can consider surgical intervention in the form of an arthroscopic debridement and biceps tenodesis if his pain persists.  He will continue with anti-inflammatories.  He will continue working with physical therapy on his shoulder range of motion, rotator strength and scapular stabilization with anti-inflammatory modalities and a home exercise program.  He will modify his activities as needed.  He will continue to ice and rest the shoulder as needed.  He will return to see me as needed.  We did have a provisional discussion regarding possible arthroscopic debridement and biceps tenodesis but will hold off at this time as this has a high likelihood for an incomplete resolution of his pain given the underlying degenerative changes of the  "shoulder.    At the end of the visit, all questions were answered in full. The patient is in agreement with the plan and recommendations. They will call the office with any questions/concerns.    Note dictated with [employer] software. Completed without full typed error editing and sent to avoid delay.       SUBJECTIVE  CHIEF COMPLAINT:   Chief Complaint   Patient presents with    Right Shoulder - Pain        HPI: Eugene West is a 40 y.o. male patient who presents today with right shoulder pain. He is here to discuss MRI results.  He continues to have difficulty particularly in certain positions and at end of range of motion.      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  He has been working diligently with physical therapy here but has had a bit of a plateau in terms of his recovery and progress.  X-rays done today. He has been dealing with right shoulder pain intermittently for the past several years, increasing in severity over the past few months. Eugene is currently seeing Vamsi at [facility] for physical therapy. He is struggling to sleep due to shoulder pain. Aleve PRN for pain. He is struggling with ADLs and playing with kids due to shoulder pain. He is unable to perform [place] workouts. He does report an occasional \"catching\" sensation in the joint, associated with a sharp pain. 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     They deny any associated neck pain.  No numbness, tingling, or paresthesias.    REVIEW OF SYSTEMS  Constitutional: See HPI for pain assessment, No significant weight loss, recent trauma  Cardiovascular: No chest pain, shortness of breath  Respiratory: No difficulty breathing, cough  Gastrointestinal: No nausea, vomiting, diarrhea, constipation  Musculoskeletal: Noted in HPI, positive for pain, restricted motion, " stiffness  Integumentary: No rashes, easy bruising, redness   Neurological: no numbness or tingling in extremities, no gait disturbances   Psychiatric: No mood changes, memory changes, social issues  Heme/Lymph: no excessive swelling, easy bruising, excessive bleeding  ENT: No hearing changes  Eyes: No vision changes    Past Medical History:   Diagnosis Date    Eosinophilic esophagitis     Eosinophilic esophagitis        No Known Allergies     Past Surgical History:   Procedure Laterality Date    OTHER SURGICAL HISTORY  12/07/2021    [place] tooth extraction    OTHER SURGICAL HISTORY  12/07/2021    Esophagoscopy        No family history on file.     Social History     Socioeconomic History    Marital status:      Spouse name: Not on file    Number of children: Not on file    Years of education: Not on file    Highest education level: Not on file   Occupational History    Not on file   Tobacco Use    Smoking status: Never    Smokeless tobacco: Never   Vaping Use    Vaping status: Never Used   Substance and Sexual Activity    Alcohol use: Yes    Drug use: Yes     Types: Marijuana    Sexual activity: Not on file   Other Topics Concern    Not on file   Social History Narrative    Not on file     Social Drivers of Health     Financial Resource Strain: Not on file   Food Insecurity: Not on file   Transportation Needs: Not on file   Physical Activity: Not on file   Stress: Not on file   Social Connections: Not on file   Intimate Partner Violence: Not on file   Housing Stability: Not on file        CURRENT MEDICATIONS:   Current Outpatient Medications   Medication Sig Dispense Refill    albuterol (ProAir HFA) 90 mcg/actuation inhaler Inhale 2 puffs every 4 hours if needed for wheezing, shortness of breath or other (take prior to exercise). 8.5 g 2    albuterol 90 mcg/actuation inhaler INHALE 2 PUFFS EVERY 4 HOURS AS NEEDED FOR COUGH OR SHORTNESS OF BREATH. 8 g 3    fluticasone (Flonase) 50 mcg/actuation nasal spray  "Administer 2 sprays into each nostril once daily. Shake gently. Before first use, prime pump. After use, clean tip and replace cap. 16 g 2    omeprazole (PriLOSEC) 20 mg DR capsule Take 1 capsule (20 mg) by mouth once daily. Do not crush or chew. 90 capsule 3     No current facility-administered medications for this visit.        OBJECTIVE    PHYSICAL EXAM      12/7/2021     2:26 PM 9/27/2022     5:47 PM 10/7/2022    12:45 PM 12/12/2022     2:08 PM 12/18/2023     2:13 PM 1/9/2025    11:44 AM   Vitals   Systolic 124 135 122 127 134    Diastolic 83 82 81 78 72    Heart Rate 57 67 68 62 63    Temp 36.1 °C (97 °F) 36.8 °C (98.3 °F) 36.2 °C (97.1 °F) 36.4 °C (97.5 °F) 36.2 °C (97.1 °F)    Resp  18       Height 1.803 m (5' 11\")  1.803 m (5' 11\") 1.791 m (5' 10.5\") 1.778 m (5' 10\") 1.803 m (5' 11\")   Weight (lb) 200 200 198 201 209 200   BMI 27.89 kg/m2 27.89 kg/m2 27.62 kg/m2 28.43 kg/m2 29.99 kg/m2 27.89 kg/m2   BSA (m2) 2.13 m2 2.13 m2 2.12 m2 2.13 m2 2.16 m2 2.13 m2   Visit Report     Report Report      There is no height or weight on file to calculate BMI.    GENERAL: A/Ox3, NAD. Appears healthy, well nourished  PSYCHIATRIC: Mood stable, appropriate memory recall  EYES: EOM intact, no scleral icterus  CARDIOVASCULAR: Palpable peripheral pulses  LUNGS: Breathing non-labored on room air  SKIN: no erythema, rashes, or ecchymosis     MUSCULOSKELETAL:  Laterality: right Shoulder Exam  - Negative Spurlings, full painless neck ROM  - Skin intact  - No erythema or warmth  - No ecchymosis or soft tissue swelling  - Shoulder ROM: Forward flexion 165, ER 55, IR upper lumbar, ER in abduction 95, IR in abduction 15  - Strength:      Jobes 5-/5     ER 5-/5     Belly press and bearhug 5-/5  - Palpation: Positive tenderness anterior and posterior joint lines  - Heath and Sandra positive  - Speeds and O'Briens positive  - Stability: Anterior/Posterior load and shift stable  - Special Tests: Positive resisted " throwers    NEUROVASCULAR:  - Neurovascular Status: sensation intact to light touch distally, upper extremity motor grossly intact  - Capillary refill brisk at extremities, Bilateral peripheral pulses 2+    Imaging: MRI of the right shoulder reviewed by me demonstrates a nondisplaced SLAP tear, biceps tenosynovitis, rotator cuff intact with mild tendinosis, moderate degenerative changes of the glenohumeral joint with a large inferior humeral head osteophyte, subacromial impingement/bursitis.  Images were personally reviewed and interpreted by me.  Radiology reports were reviewed by me as well, if readily available at the time.      Bob Valadez MD  Attending Surgeon    Sports Medicine Orthopaedic Surgery  [facility] [place]  [facility] [employer]

## 2025-02-10 ENCOUNTER — TREATMENT (OUTPATIENT)
Dept: PHYSICAL THERAPY | Facility: HOSPITAL | Age: 40
End: 2025-02-10
Payer: COMMERCIAL

## 2025-02-10 DIAGNOSIS — G89.29 CHRONIC RIGHT SHOULDER PAIN: Primary | ICD-10-CM

## 2025-02-10 DIAGNOSIS — M54.9 UPPER BACK PAIN: ICD-10-CM

## 2025-02-10 DIAGNOSIS — M25.511 CHRONIC RIGHT SHOULDER PAIN: Primary | ICD-10-CM

## 2025-02-10 DIAGNOSIS — M25.511 PAIN IN RIGHT SHOULDER: ICD-10-CM

## 2025-02-10 PROCEDURE — 97110 THERAPEUTIC EXERCISES: CPT | Mod: GP | Performed by: PHYSICAL THERAPIST

## 2025-02-10 PROCEDURE — 97140 MANUAL THERAPY 1/> REGIONS: CPT | Mod: GP | Performed by: PHYSICAL THERAPIST

## 2025-02-10 NOTE — PROGRESS NOTES
Physical Therapy  Physical Therapy Treatment Note    Patient Name: Eugene West  MRN: [id number]  Today's Date: 2/10/2025  Time Calculation  Start Time: 1200  Stop Time: 1245  Time Calculation (min): 45 min    Insurance:  Visit number: 6 of of 20 (2 visits used in 2024)  Authorization info: No Auth  Insurance Type: MMO     General:  Reason for visit: (R) Shoulder and Cervicothoracic Pain  Referred by: Direct Access    Current Problem  1. Chronic right shoulder pain        2. Pain in right shoulder  Follow Up In Physical Therapy      3. Upper back pain            Precautions: None      Subjective:     Patient had follow up with MD who recommended patient receive cortisone injection and continue PT. If symptoms persist or worsen then follow up with MD for possible surgical intervention.    Patient denies any significant change in symptoms since last visit    Pain  Pain Assessment: 0-10  0-10 (Numeric) Pain Score: 6    Performing HEP?: Yes      Objective:   (R) Shoulder ROM:  ER: 85  IR: 50 following manual      Treatment Performed:  Therapeutic Exercise:    25 min  Cross body stretch 2 x 30 sec  (B) ER w/ scap retraction 2 x 10 reps  ER 2 x 10 reps  IR 2 x 10 reps  D2 flexion 2 x 10 reps  Pull down and apart 2 x 10 reps  Prone T, Y and W - 2 x 10 reps each  Serratus push up 2 x 10 reps    Manual Therapy:    20 min  STM to (R) deltoid, RTC, and periscapular musculature  GH joint mobilizations  Manual stretching/PROM               PT Therapeutic Procedures Time Entry  Manual Therapy Time Entry: 20  Therapeutic Exercise Time Entry: 25                    Assessment:   The focus of the session was Strengthening, ROM, Stretching, joint mobilizations, and soft tissue massage. The pt demonstrated Fair tolerance to the noted exercises today. Patient able to tolerate exercises without increase in symptoms. The pt is demonstrated Poor progress in skilled rehab at this time. The pt is still limited in overall Strength, ROM,  Flexibility, Motor control, and Pain at this time. The pt continues to be a good candidate for skilled PT, in order to further improve Strength, ROM, Flexibility, Motor control, and Pain.        Plan:  Patient to continue following post op protocol; Continue to progress Shoulder and Scapular strength; Manual therapy to restore pain free ROM; Patient education on HEP and activity modification.  Patient will be following up with Dr. Valadez this week.      Vamsi Cardenas, PT

## 2025-02-13 ASSESSMENT — PAIN SCALES - GENERAL: PAINLEVEL_OUTOF10: 6

## 2025-02-13 ASSESSMENT — PAIN - FUNCTIONAL ASSESSMENT: PAIN_FUNCTIONAL_ASSESSMENT: 0-10

## 2025-02-24 ENCOUNTER — APPOINTMENT (OUTPATIENT)
Dept: PRIMARY CARE | Facility: CLINIC | Age: 40
End: 2025-02-24
Payer: COMMERCIAL

## 2025-02-24 ENCOUNTER — TREATMENT (OUTPATIENT)
Dept: PHYSICAL THERAPY | Facility: HOSPITAL | Age: 40
End: 2025-02-24
Payer: COMMERCIAL

## 2025-02-24 VITALS
HEIGHT: 71 IN | WEIGHT: 195.7 LBS | DIASTOLIC BLOOD PRESSURE: 78 MMHG | SYSTOLIC BLOOD PRESSURE: 127 MMHG | BODY MASS INDEX: 27.4 KG/M2 | TEMPERATURE: 98.2 F | HEART RATE: 61 BPM | OXYGEN SATURATION: 98 %

## 2025-02-24 DIAGNOSIS — J45.990 EXERCISE-INDUCED ASTHMA (HHS-HCC): ICD-10-CM

## 2025-02-24 DIAGNOSIS — H01.002 BLEPHARITIS OF LOWER EYELIDS OF BOTH EYES, UNSPECIFIED TYPE: ICD-10-CM

## 2025-02-24 DIAGNOSIS — G89.29 CHRONIC RIGHT SHOULDER PAIN: Primary | ICD-10-CM

## 2025-02-24 DIAGNOSIS — R06.2 WHEEZING: ICD-10-CM

## 2025-02-24 DIAGNOSIS — K20.0 EOSINOPHILIC ESOPHAGITIS: ICD-10-CM

## 2025-02-24 DIAGNOSIS — M54.9 UPPER BACK PAIN: ICD-10-CM

## 2025-02-24 DIAGNOSIS — M25.511 CHRONIC RIGHT SHOULDER PAIN: Primary | ICD-10-CM

## 2025-02-24 DIAGNOSIS — Z00.00 ENCOUNTER FOR WELLNESS EXAMINATION: Primary | ICD-10-CM

## 2025-02-24 DIAGNOSIS — M25.511 PAIN IN RIGHT SHOULDER: ICD-10-CM

## 2025-02-24 DIAGNOSIS — H01.005 BLEPHARITIS OF LOWER EYELIDS OF BOTH EYES, UNSPECIFIED TYPE: ICD-10-CM

## 2025-02-24 PROCEDURE — 99212 OFFICE O/P EST SF 10 MIN: CPT | Performed by: INTERNAL MEDICINE

## 2025-02-24 PROCEDURE — 1036F TOBACCO NON-USER: CPT | Performed by: INTERNAL MEDICINE

## 2025-02-24 PROCEDURE — 97140 MANUAL THERAPY 1/> REGIONS: CPT | Mod: GP | Performed by: PHYSICAL THERAPIST

## 2025-02-24 PROCEDURE — 99395 PREV VISIT EST AGE 18-39: CPT | Performed by: INTERNAL MEDICINE

## 2025-02-24 PROCEDURE — 97110 THERAPEUTIC EXERCISES: CPT | Mod: GP | Performed by: PHYSICAL THERAPIST

## 2025-02-24 PROCEDURE — 3008F BODY MASS INDEX DOCD: CPT | Performed by: INTERNAL MEDICINE

## 2025-02-24 RX ORDER — OMEPRAZOLE 20 MG/1
20 CAPSULE, DELAYED RELEASE ORAL DAILY PRN
Qty: 90 CAPSULE | Refills: 3 | Status: SHIPPED | OUTPATIENT
Start: 2025-02-24 | End: 2026-02-24

## 2025-02-24 RX ORDER — NAPROXEN 250 MG/1
250 TABLET ORAL
COMMUNITY

## 2025-02-24 RX ORDER — ALBUTEROL SULFATE 90 UG/1
2 INHALANT RESPIRATORY (INHALATION) EVERY 4 HOURS PRN
Qty: 8.5 G | Refills: 2 | Status: SHIPPED | OUTPATIENT
Start: 2025-02-24 | End: 2026-02-24

## 2025-02-24 NOTE — PROGRESS NOTES
"Subjective   Patient ID: Eugene West is a 40 y.o. male who presents for Annual Exam.    PMH includes allergic rhinitis, exercise induced asthma, GERD and EoE (resolved), cold sores.      Etoh: at home 0-6 drinks per week but when traveling he may have 5-6 per night a few times per week.  Nonsmoker, occasional marijuana (edible)  Works as a  and travels fairly regularly.  Has 4 boys (2nd grade, 1st grade, 5yo and almost 3yo).  Exercises regularly, tries to eat healthy.      Has had some recurrent styes, frequent towards the end of 2024 but even now often feels some inflammation in his eyelids.     ----------------Copied forward for reference:   PMH:  -GERD with esophagitis  -Recurrent herpes labialis  - PND/allergies, ? reactive airway disease/exercise-induced asthma     PSH:  -EGD x2  -[place] teeth removal  -Deviated septum repair           Objective   Physical Exam    /78 (BP Location: Right arm, Patient Position: Sitting, BP Cuff Size: Adult)   Pulse 61   Temp 36.8 °C (98.2 °F) (Temporal)   Ht 1.791 m (5' 10.5\")   Wt 88.8 kg (195 lb 11.2 oz)   SpO2 98%   BMI 27.68 kg/m²      Gen: NAD, pleasant, A&;Ox3  HEENT: PERRL, EOMI, MMM, OP clear  Neck: supple, no thyromegaly, no JVD, normal carotid upstroke  Pulm: lungs CTAB, good air movement  CV: RRR, no m/r/g, 2+ DP pulses  Abd: NABS, soft, NT, ND no HSM  Ext: no peripheral edema  Neuro: CN II-XII intact, no focal sensory or motor deficits, normal reflexes    Assessment/Plan     #exercise induced asthma: improved overall  - continue albuterol as needed: can do 15-30 minutes prior to exercise  - trial flonase nightly seasonally  - if no improvement or worsening can do PFTs, may need methacholine challenge if negative     #EoE and GERD  - continue omeprazole daily as needed   - can consider GI referral, comfort if GERD or dysphagia recurs/worsens    #Blepharitis/hordeolum  - trial of daily eyelid wash  - optho referral if persists  - consider " A/I      #cold sores  - refilled valtrex 2g BID for 1 day at first sign of outbreak     Health maintenance  -Metabolic screening today (elevated LDL last year)  -Last colonoscopy:  NA  -PSA: NA  -Smoking history: never   -Counseled regarding diet and exercise  -Immunizations: recommend annual flu, otherwise current  -Followup for AWV and prn      Tang Foote MD

## 2025-02-25 LAB
ALBUMIN SERPL-MCNC: 5 G/DL (ref 3.6–5.1)
ALP SERPL-CCNC: 50 U/L (ref 36–130)
ALT SERPL-CCNC: 22 U/L (ref 9–46)
ANION GAP SERPL CALCULATED.4IONS-SCNC: 12 MMOL/L (CALC) (ref 7–17)
AST SERPL-CCNC: 32 U/L (ref 10–40)
BILIRUB SERPL-MCNC: 0.9 MG/DL (ref 0.2–1.2)
BUN SERPL-MCNC: 23 MG/DL (ref 7–25)
CALCIUM SERPL-MCNC: 9.8 MG/DL (ref 8.6–10.3)
CHLORIDE SERPL-SCNC: 102 MMOL/L (ref 98–110)
CHOLEST SERPL-MCNC: 227 MG/DL
CHOLEST/HDLC SERPL: 4.1 (CALC)
CO2 SERPL-SCNC: 24 MMOL/L (ref 20–32)
CREAT SERPL-MCNC: 1.31 MG/DL (ref 0.6–1.26)
EGFRCR SERPLBLD CKD-EPI 2021: 71 ML/MIN/1.73M2
GLUCOSE SERPL-MCNC: 83 MG/DL (ref 65–99)
HDLC SERPL-MCNC: 56 MG/DL
LDLC SERPL CALC-MCNC: 156 MG/DL (CALC)
NONHDLC SERPL-MCNC: 171 MG/DL (CALC)
POTASSIUM SERPL-SCNC: 4.7 MMOL/L (ref 3.5–5.3)
PROT SERPL-MCNC: 7.1 G/DL (ref 6.1–8.1)
SODIUM SERPL-SCNC: 138 MMOL/L (ref 135–146)
TRIGL SERPL-MCNC: 55 MG/DL

## 2025-02-25 ASSESSMENT — PAIN SCALES - GENERAL: PAINLEVEL_OUTOF10: 6

## 2025-02-25 ASSESSMENT — PAIN - FUNCTIONAL ASSESSMENT: PAIN_FUNCTIONAL_ASSESSMENT: 0-10

## 2025-02-25 NOTE — PROGRESS NOTES
Physical Therapy  Physical Therapy Treatment Note    Patient Name: Eugene West  MRN: [id number]  Today's Date: 2/24/2025  Time Calculation  Start Time: 1200  Stop Time: 1245  Time Calculation (min): 45 min    Insurance:  Visit number: 7 of of 20 (2 visits used in 2024)  Authorization info: No Auth  Insurance Type: MMO     General:  Reason for visit: (R) Shoulder and Cervicothoracic Pain  Referred by: Direct Access    Current Problem  1. Chronic right shoulder pain        2. Pain in right shoulder  Follow Up In Physical Therapy      3. Upper back pain            Precautions: None      Subjective:     Patient notes general improvement in shoulder pain the past 2 weeks. Patient attributes improvement due to modified activity; avoiding any pressing motions in gym. Patient is scheduled for injection next week    Pain  Pain Assessment: 0-10  0-10 (Numeric) Pain Score: 6    Performing HEP?: Yes      Objective:   (R) Shoulder ROM:  ER: 85  IR: 50 following manual      Treatment Performed:  Therapeutic Exercise:    25 min  Cross body stretch 2 x 30 sec  (B) ER w/ scap retraction 2 x 10 reps  ER 2 x 10 reps  IR 2 x 10 reps  D2 flexion 2 x 10 reps  Pull down and apart 2 x 10 reps  Prone T, Y and W - 2 x 10 reps each  Serratus push up 2 x 10 reps    Manual Therapy:    20 min  STM to (R) deltoid, RTC, and periscapular musculature  GH joint mobilizations  Manual stretching/PROM               PT Therapeutic Procedures Time Entry  Manual Therapy Time Entry: 20  Therapeutic Exercise Time Entry: 25                    Assessment:   The focus of the session was Strengthening, ROM, Stretching, joint mobilizations, and soft tissue massage. The pt demonstrated Fair tolerance to the noted exercises today. No significant change to treatment approach today. The pt is demonstrated Poor progress in skilled rehab at this time. The pt is still limited in overall Strength, ROM, Flexibility, Motor control, and Pain at this time. The pt  continues to be a good candidate for skilled PT, in order to further improve Strength, ROM, Flexibility, Motor control, and Pain.        Plan:  Patient to receive injection next week and return to PT with increased focus on restoring pain free mobility. Continue to progress Shoulder and Scapular strength; Manual therapy to restore pain free ROM; Patient education on HEP and activity modification      Vamsi Cardenas, PT

## 2025-03-07 ENCOUNTER — HOSPITAL ENCOUNTER (OUTPATIENT)
Dept: RADIOLOGY | Facility: EXTERNAL LOCATION | Age: 40
Discharge: HOME | End: 2025-03-07

## 2025-03-07 ENCOUNTER — OFFICE VISIT (OUTPATIENT)
Dept: ORTHOPEDIC SURGERY | Facility: HOSPITAL | Age: 40
End: 2025-03-07
Payer: COMMERCIAL

## 2025-03-07 VITALS — WEIGHT: 190 LBS | HEIGHT: 71 IN | BODY MASS INDEX: 26.6 KG/M2

## 2025-03-07 DIAGNOSIS — S43.431A SUPERIOR LABRUM ANTERIOR-TO-POSTERIOR (SLAP) TEAR OF RIGHT SHOULDER: ICD-10-CM

## 2025-03-07 DIAGNOSIS — M19.011 ARTHRITIS OF SHOULDER REGION, RIGHT: Primary | ICD-10-CM

## 2025-03-07 DIAGNOSIS — M75.21 BICEPS TENDONITIS ON RIGHT: ICD-10-CM

## 2025-03-07 PROCEDURE — 20550 NJX 1 TENDON SHEATH/LIGAMENT: CPT | Mod: RT | Performed by: FAMILY MEDICINE

## 2025-03-07 PROCEDURE — 99214 OFFICE O/P EST MOD 30 MIN: CPT | Mod: 25 | Performed by: FAMILY MEDICINE

## 2025-03-07 PROCEDURE — 20611 DRAIN/INJ JOINT/BURSA W/US: CPT | Mod: RT | Performed by: FAMILY MEDICINE

## 2025-03-07 PROCEDURE — 2500000004 HC RX 250 GENERAL PHARMACY W/ HCPCS (ALT 636 FOR OP/ED): Performed by: FAMILY MEDICINE

## 2025-03-07 RX ORDER — TRIAMCINOLONE ACETONIDE 40 MG/ML
40 INJECTION, SUSPENSION INTRA-ARTICULAR; INTRAMUSCULAR
Status: COMPLETED | OUTPATIENT
Start: 2025-03-07 | End: 2025-03-07

## 2025-03-07 RX ORDER — LIDOCAINE HYDROCHLORIDE 10 MG/ML
3 INJECTION, SOLUTION INFILTRATION; PERINEURAL
Status: COMPLETED | OUTPATIENT
Start: 2025-03-07 | End: 2025-03-07

## 2025-03-07 RX ORDER — LIDOCAINE HYDROCHLORIDE 10 MG/ML
4 INJECTION, SOLUTION INFILTRATION; PERINEURAL
Status: COMPLETED | OUTPATIENT
Start: 2025-03-07 | End: 2025-03-07

## 2025-03-07 RX ADMIN — LIDOCAINE HYDROCHLORIDE 4 ML: 10 INJECTION, SOLUTION INFILTRATION; PERINEURAL at 12:20

## 2025-03-07 RX ADMIN — TRIAMCINOLONE ACETONIDE 40 MG: 200 INJECTION, SUSPENSION INTRA-ARTICULAR; INTRAMUSCULAR at 12:21

## 2025-03-07 RX ADMIN — TRIAMCINOLONE ACETONIDE 40 MG: 200 INJECTION, SUSPENSION INTRA-ARTICULAR; INTRAMUSCULAR at 12:20

## 2025-03-07 RX ADMIN — LIDOCAINE HYDROCHLORIDE 3 ML: 10 INJECTION, SOLUTION INFILTRATION; PERINEURAL at 12:21

## 2025-03-07 ASSESSMENT — PAIN SCALES - GENERAL: PAINLEVEL_OUTOF10: 6

## 2025-03-07 ASSESSMENT — PAIN - FUNCTIONAL ASSESSMENT: PAIN_FUNCTIONAL_ASSESSMENT: 0-10

## 2025-03-07 NOTE — PROGRESS NOTES
** Please excuse any errors in grammar or translation related to this dictation. Voice recognition software was utilized to prepare this document. **    Assessment & Plan:  40-year-old male with chronic labral tearing with secondary osteoarthritis and associated biceps tenosynovitis.  Pain progressively worsened over the past 6 months.  Patient has met with Dr. Valadez recently and discussed surgical intervention to include labral debridement and biceps tenodesis.  Patient also states that they discussed how ultimately he would likely require arthroplasty in the future.  Patient would prefer to exhaust all nonoperative measures prior to moving forward with surgery.  For this reason he was offered completion of glenohumeral joint and biceps tendon sheath steroid injections today which he agreed to have performed.  See below.  Informed patient that these can be performed every 3 or more months as symptoms dictate but ideally would space out for longer periods of time.  Return precautions given. All questions answered and patient is agreeable to plan of care.    Copy of today's note sent to Dr. Valadez for review.      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  This complaint has been ongoing for over 15 years, worsening in the past 6 months.  Reports initial injury occurred during a batting drill.  He was to undergo labral repair at that time however he received a steroid injection and had extensive rehab and was able to return to sporting activity without issue.  Over the years has had intermittent right shoulder pain but has been able to continue with his normal functioning.  However the past 6 months pain has increased and is provoked with lesser levels of activity.  No reported sensory changes or weakness, joint locking, or feeling of instability.  Symptoms are aggravated by ADLs. Treatment to date  includes Physical Therapy. Previously saw Dr. Valadez for this with last visit being 2/5/25.  Currently using Aleve for symptom control. Denies previous surgery to this site.  Patient referred for nonoperative treatment.      Patient Active Problem List   Diagnosis    Eosinophilic esophagitis    Post-nasal drip    Herpes labialis    Chronic right shoulder pain    Upper back pain    Exercise-induced asthma ([facility])     Past Surgical History:   Procedure Laterality Date    OTHER SURGICAL HISTORY  12/07/2021    B[place] tooth extraction    OTHER SURGICAL HISTORY  12/07/2021    Esophagoscopy     Current Outpatient Medications on File Prior to Visit   Medication Sig Dispense Refill    albuterol (ProAir HFA) 90 mcg/actuation inhaler Inhale 2 puffs every 4 hours if needed for wheezing, shortness of breath or other (take prior to exercise). 8.5 g 2    fluticasone (Flonase) 50 mcg/actuation nasal spray Administer 2 sprays into each nostril once daily. Shake gently. Before first use, prime pump. After use, clean tip and replace cap. 16 g 2    naproxen (Naprosyn) 250 mg tablet Take 1 tablet (250 mg) by mouth.      omeprazole (PriLOSEC) 20 mg DR capsule Take 1 capsule (20 mg) by mouth once daily as needed (GERD/reflux). Do not crush or chew. 90 capsule 3     No current facility-administered medications on file prior to visit.       Exam:  General Exam:  Constitutional - NAD, AAO x 3, conversing appropriately.  HEENT- Normocephalic and atraumatic. No facial deformities. Hearing grossly normal.  Lungs - Breathing non-labored with normal rate. No accessory muscle use.  CV - Extremities warm and well-perfused, brisk capillary refill present.   Neuro - CN II-XII grossly intact.    Right shoulder Exam:  No swelling, warmth or ecchymosis of shoulder present.   AROM: Flexion 160 deg; Abduction 160 deg; IR to 70, ER to 90  TTP over clavicle, AC joint, subacromial space, posterior GHJ line, LHB tendon, deltoid, trapezius  [5]/5 strength in  ER, IR, abduction, flexion   SILT in all dermatomal distributions C5-T1  LABRUM: [+] O´teodoro´s, [+] Crank test, [-] Jerk test  IMPINGEMENT:  [-] Hawkin´s, [-] Neer´s  ROTATOR CUFF: [-] Limb drop, [-] lag signs, [-] Empty Can (SS), [-] Belly press (SSc), [-] Hornblower (IS/TM)  BICEPS: [+] Speed´s  AC JOINT: [-] Scarf test      Results:  Xrays of right shoulder obtained 1/9/2025 personally interpreted as moderate GHJ and mild ACJ degenerative changes with joint space narrowing, osteophyte formation, and subchondral sclerosis.     MRI of right shoulder obtained 1/31/2025 perse interpreted as SLAP tear, moderate degenerative changes of glenohumeral joint, and biceps tenosynovitis.    Reviewed referral note from Dr. Valadez.  Lab Results   Component Value Date    CREATININE 1.31 (H) 02/24/2025    EGFR 71 02/24/2025     Procedure:   Patient ID: Eugene West is a 40 y.o. male.    L Inj/Asp: R glenohumeral on 3/7/2025 12:20 PM  Indications: pain  Details: 25 G needle, ultrasound-guided posterior approach  Medications: 40 mg triamcinolone acetonide 40 mg/mL; 4 mL lidocaine 10 mg/mL (1 %)  Outcome: tolerated well, no immediate complications    Procedure risk factors to include increased pain, bleeding, infection, neurovascular injury, soft tissue injury, progressive cartilage loss, transient elevation of blood glucose and blood pressure, and adverse reaction to medication were discussed with the patient. Patient understands there is a moderate risk of morbidity from undergoing the procedure.    Procedure, treatment alternatives, risks and benefits explained, specific risks discussed. Consent was given by the patient. Immediately prior to procedure a time out was called to verify the correct patient, procedure, equipment, support staff and site/side marked as required. Patient was prepped and draped in the usual sterile fashion.       Tendon Sheath Injection: right long head of biceps tendon sheath on 3/7/2025 12:21  PM  Indications: pain, diagnostic and therapeutic benefit  Details: 25 G needle, ultrasound-guided anterior approach  Medications: 40 mg triamcinolone acetonide 40 mg/mL; 3 mL lidocaine 10 mg/mL (1 %)  Outcome: tolerated well, no immediate complications    Procedure risk factors to include increased pain, bleeding, infection, neurovascular injury, soft tissue injury, progressive cartilage loss, transient elevation of blood glucose and blood pressure, and adverse reaction to medication were discussed with the patient. Patient understands there is a moderate risk of morbidity from undergoing the procedure.    Procedure, treatment alternatives, risks and benefits explained, specific risks discussed. Consent was given by the patient. Immediately prior to procedure a time out was called to verify the correct patient, procedure, equipment, support staff and site/side marked as required. Patient was prepped and draped in the usual sterile fashion.

## 2025-03-13 ENCOUNTER — TREATMENT (OUTPATIENT)
Dept: PHYSICAL THERAPY | Facility: HOSPITAL | Age: 40
End: 2025-03-13
Payer: COMMERCIAL

## 2025-03-13 DIAGNOSIS — G89.29 CHRONIC RIGHT SHOULDER PAIN: Primary | ICD-10-CM

## 2025-03-13 DIAGNOSIS — M25.511 CHRONIC RIGHT SHOULDER PAIN: Primary | ICD-10-CM

## 2025-03-13 DIAGNOSIS — M54.9 UPPER BACK PAIN: ICD-10-CM

## 2025-03-13 DIAGNOSIS — M25.511 PAIN IN RIGHT SHOULDER: ICD-10-CM

## 2025-03-13 PROCEDURE — 97110 THERAPEUTIC EXERCISES: CPT | Mod: GP | Performed by: PHYSICAL THERAPIST

## 2025-03-13 PROCEDURE — 97140 MANUAL THERAPY 1/> REGIONS: CPT | Mod: GP | Performed by: PHYSICAL THERAPIST

## 2025-03-13 ASSESSMENT — PAIN - FUNCTIONAL ASSESSMENT: PAIN_FUNCTIONAL_ASSESSMENT: 0-10

## 2025-03-13 ASSESSMENT — PAIN SCALES - GENERAL: PAINLEVEL_OUTOF10: 4

## 2025-03-13 NOTE — PROGRESS NOTES
Physical Therapy  Physical Therapy Treatment Note    Patient Name: Eugene West  MRN: [id number]  Today's Date: 3/13/2025  Time Calculation  Start Time: 1100  Stop Time: 1150  Time Calculation (min): 50 min    Insurance:  Visit number: 8 of of 20 (2 visits used in 2024)  Authorization info: No Auth  Insurance Type: MMO     General:  Reason for visit: (R) Shoulder and Cervicothoracic Pain  Referred by: Direct Access    Current Problem  1. Chronic right shoulder pain        2. Pain in right shoulder  Follow Up In Physical Therapy      3. Upper back pain            Precautions: None      Subjective:     Patient received cortisone injection last week and notes moderate improvement in overall shoulder discomfort. Patient notes greater ease and less discomfort with ADLs. Continues to struggle with reaching behind back or overhead activities.    Pain  Pain Assessment: 0-10  0-10 (Numeric) Pain Score: 4    Performing HEP?: Yes      Objective:   (R) Shoulder ROM:  ER: 90  IR: 50 following manual      Treatment Performed:  Therapeutic Exercise:    25 min  Cross body stretch 2 x 30 sec  Sidelying ER 2 x 10 reps  ER 2 x 10 reps  IR 2 x 10 reps  D2 flexion 2 x 10 reps  Pull down and apart 2 x 10 reps  Rows 2 x 19 reps  Prone T, Y and W - 2 x 10 reps each      Manual Therapy:    25 min  STM to (R) deltoid, RTC, and periscapular musculature  GH joint mobilizations  Manual stretching/PROM               PT Therapeutic Procedures Time Entry  Manual Therapy Time Entry: 25  Therapeutic Exercise Time Entry: 25                    Assessment:   The focus of the session was Strengthening, ROM, Stretching, joint mobilizations, and soft tissue massage. The pt demonstrated improved tolerance to the noted exercises today. Patient with continued difficulty/discomfort with D2 flexion, however all other exercises improved. The pt is demonstrated Fair progress in skilled rehab at this time. The pt is still limited in overall Strength, ROM,  Flexibility, Motor control, and Pain at this time. The pt continues to be a good candidate for skilled PT, in order to further improve Strength, ROM, Flexibility, Motor control, and Pain.        Plan:  Patient to receive injection next week and return to PT with increased focus on restoring pain free mobility. Continue to progress Shoulder and Scapular strength; Manual therapy to restore pain free ROM; Patient education on HEP and activity modification      Vamsi Cardenas, PT

## 2025-03-17 ENCOUNTER — TREATMENT (OUTPATIENT)
Dept: PHYSICAL THERAPY | Facility: HOSPITAL | Age: 40
End: 2025-03-17
Payer: COMMERCIAL

## 2025-03-17 DIAGNOSIS — M25.511 CHRONIC RIGHT SHOULDER PAIN: Primary | ICD-10-CM

## 2025-03-17 DIAGNOSIS — M25.511 PAIN IN RIGHT SHOULDER: ICD-10-CM

## 2025-03-17 DIAGNOSIS — G89.29 CHRONIC RIGHT SHOULDER PAIN: Primary | ICD-10-CM

## 2025-03-17 DIAGNOSIS — M54.9 UPPER BACK PAIN: ICD-10-CM

## 2025-03-17 PROCEDURE — 97140 MANUAL THERAPY 1/> REGIONS: CPT | Mod: GP | Performed by: PHYSICAL THERAPIST

## 2025-03-17 PROCEDURE — 97110 THERAPEUTIC EXERCISES: CPT | Mod: GP | Performed by: PHYSICAL THERAPIST

## 2025-03-17 ASSESSMENT — PAIN SCALES - GENERAL: PAINLEVEL_OUTOF10: 3

## 2025-03-17 ASSESSMENT — PAIN - FUNCTIONAL ASSESSMENT: PAIN_FUNCTIONAL_ASSESSMENT: 0-10

## 2025-03-17 NOTE — PROGRESS NOTES
Physical Therapy  Physical Therapy Treatment Note    Patient Name: Eugene West  MRN: [id number]  Today's Date: 3/17/2025  Time Calculation  Start Time: 1007  Stop Time: 1050  Time Calculation (min): 43 min    Insurance:  Visit number: 9 of of 20 (2 visits used in 2024)  Authorization info: No Auth  Insurance Type: MMO     General:  Reason for visit: (R) Shoulder and Cervicothoracic Pain  Referred by: Direct Access    Current Problem  1. Chronic right shoulder pain        2. Pain in right shoulder  Follow Up In Physical Therapy      3. Upper back pain            Precautions: None      Subjective:     Patient notes continued gradual improvement in symptoms, particularly with pressing motions and reaching overhead. Patient is encouraged by recent progress.    Pain  Pain Assessment: 0-10  0-10 (Numeric) Pain Score: 3    Performing HEP?: Yes      Objective:   (R) Shoulder ROM:  ER: 90  IR: 50 following manual      Treatment Performed:  Therapeutic Exercise:    18 min  Cross body stretch 2 x 30 sec  Sidelying ER 2 x 10 reps  ER 2 x 10 reps  IR 2 x 10 reps  Pull down and apart 2 x 10 reps  Rows 2 x 10 reps  Prone T, Y and W - 2 x 10 reps each      Manual Therapy:    25 min  STM to (R) deltoid, RTC, and periscapular musculature  GH joint mobilizations  Manual stretching/PROM               PT Therapeutic Procedures Time Entry  Manual Therapy Time Entry: 25  Therapeutic Exercise Time Entry: 18                    Assessment:   The focus of the session was Strengthening, ROM, Stretching, joint mobilizations, and soft tissue massage. Shortened session due to patient needing to leave for meeting. No pain or issues with exercises today. The pt is demonstrated Fair progress in skilled rehab at this time. The pt is still limited in overall Strength, ROM, Flexibility, Motor control, and Pain at this time. The pt continues to be a good candidate for skilled PT, in order to further improve Strength, ROM, Flexibility, Motor control,  SCLERITIS- BY HX, NO INFLAMMATION TODAY. and Pain.        Plan:  Patient to receive injection next week and return to PT with increased focus on restoring pain free mobility. Continue to progress Shoulder and Scapular strength; Manual therapy to restore pain free ROM; Patient education on HEP and activity modification      Vamsi Cardenas, PT

## 2025-03-31 ENCOUNTER — TREATMENT (OUTPATIENT)
Dept: PHYSICAL THERAPY | Facility: HOSPITAL | Age: 40
End: 2025-03-31
Payer: COMMERCIAL

## 2025-03-31 DIAGNOSIS — M25.511 CHRONIC RIGHT SHOULDER PAIN: Primary | ICD-10-CM

## 2025-03-31 DIAGNOSIS — G89.29 CHRONIC RIGHT SHOULDER PAIN: Primary | ICD-10-CM

## 2025-03-31 DIAGNOSIS — M54.9 UPPER BACK PAIN: ICD-10-CM

## 2025-03-31 DIAGNOSIS — M25.511 PAIN IN RIGHT SHOULDER: ICD-10-CM

## 2025-03-31 PROCEDURE — 97140 MANUAL THERAPY 1/> REGIONS: CPT | Mod: GP | Performed by: PHYSICAL THERAPIST

## 2025-03-31 PROCEDURE — 97110 THERAPEUTIC EXERCISES: CPT | Mod: GP | Performed by: PHYSICAL THERAPIST

## 2025-03-31 NOTE — PROGRESS NOTES
Physical Therapy  Physical Therapy Treatment Note    Patient Name: Eugene West  MRN: [id number]  Today's Date: 3/31/2025  Time Calculation  Start Time: 1400  Stop Time: 1445  Time Calculation (min): 45 min    Insurance:  Visit number: 10 of of 20 (2 visits used in 2024)  Authorization info: No Auth  Insurance Type: MMO     General:  Reason for visit: (R) Shoulder and Cervicothoracic Pain  Referred by: Direct Access    Current Problem  1. Chronic right shoulder pain        2. Pain in right shoulder  Follow Up In Physical Therapy      3. Upper back pain            Precautions: None      Subjective:     Patient continues to note steady improvement in shoulder pain and function. Patient notes that he was able to perform some light throwing with his son.    Pain  Pain Assessment: 0-10  0-10 (Numeric) Pain Score: 2    Performing HEP?: Yes      Objective:   (R) Shoulder ROM:  ER: 90  IR: 60 following manual      Treatment Performed:  Therapeutic Exercise:    25 min  Cross body stretch 2 x 30 sec  Sidelying ER 2 x 10 reps  ER 2 x 10 reps - Slow/Quick  IR 2 x 10 reps - Slow/Quick  Pull down and apart 2 x 10 reps  Rows 2 x 10 reps  Prone T, Y - 2 x 10 reps each  Ball toss (yellow) 2 x 10 reps  ER Ball toss (yellow) 2 x 10 reps      Manual Therapy:    20 min  STM to (R) deltoid, RTC, and periscapular musculature  GH joint mobilizations  Manual stretching/PROM               PT Therapeutic Procedures Time Entry  Manual Therapy Time Entry: 20  Therapeutic Exercise Time Entry: 25                    Assessment:   The focus of the session was Strengthening, ROM, Stretching, joint mobilizations, and soft tissue massage. Patient able to perform all exercises including ball toss without issues. The pt is demonstrated Good progress in skilled rehab at this time. The pt is still limited in overall Strength, ROM, Flexibility, Motor control, and Pain at this time. The pt continues to be a good candidate for skilled PT, in order to  further improve Strength, ROM, Flexibility, Motor control, and Pain.        Plan:  Patient to continue HEP and progress activity (throwing) over the next 2 weeks. If progress is maintained, patient will be discharged following next visit.      Vamsi Cardenas, PT

## 2025-04-01 ASSESSMENT — PAIN SCALES - GENERAL: PAINLEVEL_OUTOF10: 2

## 2025-04-01 ASSESSMENT — PAIN - FUNCTIONAL ASSESSMENT: PAIN_FUNCTIONAL_ASSESSMENT: 0-10

## 2025-04-14 ENCOUNTER — APPOINTMENT (OUTPATIENT)
Dept: PHYSICAL THERAPY | Facility: HOSPITAL | Age: 40
End: 2025-04-14
Payer: COMMERCIAL

## 2025-04-21 ENCOUNTER — TREATMENT (OUTPATIENT)
Dept: PHYSICAL THERAPY | Facility: HOSPITAL | Age: 40
End: 2025-04-21
Payer: COMMERCIAL

## 2025-04-21 DIAGNOSIS — M25.511 CHRONIC RIGHT SHOULDER PAIN: Primary | ICD-10-CM

## 2025-04-21 DIAGNOSIS — G89.29 CHRONIC RIGHT SHOULDER PAIN: Primary | ICD-10-CM

## 2025-04-21 DIAGNOSIS — M54.9 UPPER BACK PAIN: ICD-10-CM

## 2025-04-21 DIAGNOSIS — M25.511 PAIN IN RIGHT SHOULDER: ICD-10-CM

## 2025-04-21 PROCEDURE — 97140 MANUAL THERAPY 1/> REGIONS: CPT | Mod: GP | Performed by: PHYSICAL THERAPIST

## 2025-04-21 PROCEDURE — 97110 THERAPEUTIC EXERCISES: CPT | Mod: GP | Performed by: PHYSICAL THERAPIST

## 2025-04-23 ASSESSMENT — PAIN SCALES - GENERAL: PAINLEVEL_OUTOF10: 1

## 2025-04-23 ASSESSMENT — PAIN - FUNCTIONAL ASSESSMENT: PAIN_FUNCTIONAL_ASSESSMENT: 0-10

## 2025-04-23 NOTE — PROGRESS NOTES
Physical Therapy  Physical Therapy Treatment Note    Patient Name: Eugene West  MRN: [id number]  Today's Date: 4/21/2025  Time Calculation  Start Time: 0900  Stop Time: 0945  Time Calculation (min): 45 min    Insurance:  Visit number: 11 of of 20 (2 visits used in 2024)  Authorization info: No Auth  Insurance Type: MMO     General:  Reason for visit: (R) Shoulder and Cervicothoracic Pain  Referred by: Direct Access    Current Problem  1. Chronic right shoulder pain        2. Pain in right shoulder  Follow Up In Physical Therapy      3. Upper back pain            Precautions: None      Subjective:     Patient states he has continued HEP and has maintained progress. Overall patient has minimal occurences of pain; slight modification in activity. Patient is happy with progress made    Pain  Pain Assessment: 0-10  0-10 (Numeric) Pain Score: 1    Performing HEP?: Yes      Objective:   (R) Shoulder ROM:  ER: 90  IR: 60 following manual      Treatment Performed:  Therapeutic Exercise:    25 min  Cross body stretch 2 x 30 sec  Sidelying ER 2 x 10 reps  ER 2 x 10 reps - Slow/Quick  IR 2 x 10 reps - Slow/Quick  Pull down and apart 2 x 10 reps  Rows 2 x 10 reps  Prone T, Y - 2 x 10 reps each  KB carry - varying positions 3 x 20 yards      Manual Therapy:    20 min  STM to (R) deltoid, RTC, and periscapular musculature  GH joint mobilizations  Manual stretching/PROM           PT Therapeutic Procedures Time Entry  Manual Therapy Time Entry: 20  Therapeutic Exercise Time Entry: 25                  Goals:  Active       PT Problem       Short Term (Met)       Start:  12/18/24    Expected End:  01/13/25    Resolved:  04/23/25    Decrease patients complaints of (R) shoulder pain to 3/10 on average with ADLs by week 4  Decrease patient complaints of cervicothoracic pain to 3/10 on avaerage with ADLs by week 4  Improved patients IR ROM to 60 degrees by 4 weeks to allow patient to reach behind back for dressing  Improve patients RTC  and periscapular strength to 4/5 to decrease compensations with ADLs  Patient will be independent with HEP by week 1         Long Term (Progressing)       Start:  12/18/24    Expected End:  02/10/25       Decrease patients complaints of (R) shoulder and cervicothoracic pain to 0/10 with all ADLs and therapeutic exercises by week 8  Improve patients IR ROM to 80+ degrees by 8 weeks to allow patient to perform dressing, grooming, and throw a ball without compensation  Improve patients RTC and periscapular strength to 5/5 to decrease compensations with ADLs             Assessment:   The focus of the session was Strengthening, ROM, Stretching, joint mobilizations, and soft tissue massage. Patient has made excellent progress with skilled PT intervention and recent injection. Patient is able to perform all ADLs without pain and minimal to no modification required. Patient continues to be limited in some overhead lifting activities and with throwing, but patient is satisfied with overall progress. Patient will continue with HEP and contact PT with any questions or concerns. Patient is discharged from PT at this time.      Plan:  Patient will continue with HEP and contact PT with any questions or concerns. Patient is discharged from PT at this time.        Vamsi Cardenas, PT

## 2026-02-27 ENCOUNTER — APPOINTMENT (OUTPATIENT)
Dept: PRIMARY CARE | Facility: CLINIC | Age: 41
End: 2026-02-27
Payer: COMMERCIAL